# Patient Record
Sex: MALE | Race: WHITE | NOT HISPANIC OR LATINO | Employment: OTHER | URBAN - METROPOLITAN AREA
[De-identification: names, ages, dates, MRNs, and addresses within clinical notes are randomized per-mention and may not be internally consistent; named-entity substitution may affect disease eponyms.]

---

## 2020-07-08 ENCOUNTER — HOSPITAL ENCOUNTER (EMERGENCY)
Facility: CLINIC | Age: 50
Discharge: HOME | End: 2020-07-08
Attending: EMERGENCY MEDICINE | Admitting: EMERGENCY MEDICINE
Payer: COMMERCIAL

## 2020-07-08 ENCOUNTER — APPOINTMENT (OUTPATIENT)
Dept: CT IMAGING | Facility: CLINIC | Age: 50
End: 2020-07-08
Attending: EMERGENCY MEDICINE
Payer: COMMERCIAL

## 2020-07-08 VITALS
RESPIRATION RATE: 18 BRPM | DIASTOLIC BLOOD PRESSURE: 112 MMHG | OXYGEN SATURATION: 95 % | HEART RATE: 50 BPM | SYSTOLIC BLOOD PRESSURE: 184 MMHG | TEMPERATURE: 98.6 F

## 2020-07-08 DIAGNOSIS — I10 BENIGN ESSENTIAL HYPERTENSION: ICD-10-CM

## 2020-07-08 DIAGNOSIS — N20.1 LEFT URETERAL CALCULUS: ICD-10-CM

## 2020-07-08 LAB
ALBUMIN UR-MCNC: 30 MG/DL
ANION GAP SERPL CALCULATED.3IONS-SCNC: 8 MMOL/L (ref 3–14)
APPEARANCE UR: CLEAR
BASOPHILS # BLD AUTO: 0 10E9/L (ref 0–0.2)
BASOPHILS NFR BLD AUTO: 0.3 %
BILIRUB UR QL STRIP: NEGATIVE
BUN SERPL-MCNC: 19 MG/DL (ref 7–30)
CALCIUM SERPL-MCNC: 8.7 MG/DL (ref 8.5–10.1)
CAOX CRY #/AREA URNS HPF: ABNORMAL /HPF
CHLORIDE SERPL-SCNC: 111 MMOL/L (ref 94–109)
CO2 SERPL-SCNC: 21 MMOL/L (ref 20–32)
COLOR UR AUTO: YELLOW
CREAT SERPL-MCNC: 0.94 MG/DL (ref 0.66–1.25)
DIFFERENTIAL METHOD BLD: ABNORMAL
EOSINOPHIL # BLD AUTO: 0.2 10E9/L (ref 0–0.7)
EOSINOPHIL NFR BLD AUTO: 2.4 %
ERYTHROCYTE [DISTWIDTH] IN BLOOD BY AUTOMATED COUNT: 13.2 % (ref 10–15)
GFR SERPL CREATININE-BSD FRML MDRD: >90 ML/MIN/{1.73_M2}
GLUCOSE SERPL-MCNC: 116 MG/DL (ref 70–99)
GLUCOSE UR STRIP-MCNC: NEGATIVE MG/DL
HCT VFR BLD AUTO: 44.9 % (ref 40–53)
HGB BLD-MCNC: 15.6 G/DL (ref 13.3–17.7)
HGB UR QL STRIP: ABNORMAL
IMM GRANULOCYTES # BLD: 0 10E9/L (ref 0–0.4)
IMM GRANULOCYTES NFR BLD: 0.3 %
KETONES UR STRIP-MCNC: NEGATIVE MG/DL
LEUKOCYTE ESTERASE UR QL STRIP: NEGATIVE
LIPASE SERPL-CCNC: 117 U/L (ref 73–393)
LYMPHOCYTES # BLD AUTO: 1.9 10E9/L (ref 0.8–5.3)
LYMPHOCYTES NFR BLD AUTO: 29 %
MCH RBC QN AUTO: 33.9 PG (ref 26.5–33)
MCHC RBC AUTO-ENTMCNC: 34.7 G/DL (ref 31.5–36.5)
MCV RBC AUTO: 98 FL (ref 78–100)
MONOCYTES # BLD AUTO: 0.7 10E9/L (ref 0–1.3)
MONOCYTES NFR BLD AUTO: 10.6 %
MUCOUS THREADS #/AREA URNS LPF: PRESENT /LPF
NEUTROPHILS # BLD AUTO: 3.8 10E9/L (ref 1.6–8.3)
NEUTROPHILS NFR BLD AUTO: 57.4 %
NITRATE UR QL: NEGATIVE
NRBC # BLD AUTO: 0 10*3/UL
NRBC BLD AUTO-RTO: 0 /100
PH UR STRIP: 5.5 PH (ref 5–7)
PLATELET # BLD AUTO: 223 10E9/L (ref 150–450)
POTASSIUM SERPL-SCNC: 3.4 MMOL/L (ref 3.4–5.3)
RBC # BLD AUTO: 4.6 10E12/L (ref 4.4–5.9)
RBC #/AREA URNS AUTO: >182 /HPF (ref 0–2)
SODIUM SERPL-SCNC: 140 MMOL/L (ref 133–144)
SOURCE: ABNORMAL
SP GR UR STRIP: 1.03 (ref 1–1.03)
SQUAMOUS #/AREA URNS AUTO: <1 /HPF (ref 0–1)
UROBILINOGEN UR STRIP-MCNC: 2 MG/DL (ref 0–2)
WBC # BLD AUTO: 6.6 10E9/L (ref 4–11)
WBC #/AREA URNS AUTO: 0 /HPF (ref 0–5)

## 2020-07-08 PROCEDURE — 74176 CT ABD & PELVIS W/O CONTRAST: CPT

## 2020-07-08 PROCEDURE — 83690 ASSAY OF LIPASE: CPT | Performed by: EMERGENCY MEDICINE

## 2020-07-08 PROCEDURE — 25000128 H RX IP 250 OP 636: Performed by: EMERGENCY MEDICINE

## 2020-07-08 PROCEDURE — 85025 COMPLETE CBC W/AUTO DIFF WBC: CPT | Performed by: EMERGENCY MEDICINE

## 2020-07-08 PROCEDURE — 25000128 H RX IP 250 OP 636

## 2020-07-08 PROCEDURE — 80048 BASIC METABOLIC PNL TOTAL CA: CPT | Performed by: EMERGENCY MEDICINE

## 2020-07-08 PROCEDURE — 96375 TX/PRO/DX INJ NEW DRUG ADDON: CPT

## 2020-07-08 PROCEDURE — 96374 THER/PROPH/DIAG INJ IV PUSH: CPT

## 2020-07-08 PROCEDURE — 25000132 ZZH RX MED GY IP 250 OP 250 PS 637: Performed by: EMERGENCY MEDICINE

## 2020-07-08 PROCEDURE — 99285 EMERGENCY DEPT VISIT HI MDM: CPT | Mod: 25

## 2020-07-08 PROCEDURE — 96376 TX/PRO/DX INJ SAME DRUG ADON: CPT

## 2020-07-08 PROCEDURE — 81001 URINALYSIS AUTO W/SCOPE: CPT | Performed by: EMERGENCY MEDICINE

## 2020-07-08 RX ORDER — HYDROMORPHONE HYDROCHLORIDE 1 MG/ML
0.5 INJECTION, SOLUTION INTRAMUSCULAR; INTRAVENOUS; SUBCUTANEOUS
Status: DISCONTINUED | OUTPATIENT
Start: 2020-07-08 | End: 2020-07-08 | Stop reason: HOSPADM

## 2020-07-08 RX ORDER — HYDROCODONE BITARTRATE AND ACETAMINOPHEN 5; 325 MG/1; MG/1
1-2 TABLET ORAL EVERY 4 HOURS PRN
Qty: 13 TABLET | Refills: 0 | Status: SHIPPED | OUTPATIENT
Start: 2020-07-08 | End: 2021-01-04

## 2020-07-08 RX ORDER — ONDANSETRON 2 MG/ML
4 INJECTION INTRAMUSCULAR; INTRAVENOUS ONCE
Status: COMPLETED | OUTPATIENT
Start: 2020-07-08 | End: 2020-07-08

## 2020-07-08 RX ORDER — HYDROCODONE BITARTRATE AND ACETAMINOPHEN 5; 325 MG/1; MG/1
2 TABLET ORAL ONCE
Status: COMPLETED | OUTPATIENT
Start: 2020-07-08 | End: 2020-07-08

## 2020-07-08 RX ORDER — TAMSULOSIN HYDROCHLORIDE 0.4 MG/1
0.4 CAPSULE ORAL DAILY
Qty: 5 CAPSULE | Refills: 0 | Status: SHIPPED | OUTPATIENT
Start: 2020-07-08 | End: 2020-07-13

## 2020-07-08 RX ORDER — ONDANSETRON 4 MG/1
4 TABLET, ORALLY DISINTEGRATING ORAL EVERY 6 HOURS PRN
Qty: 15 TABLET | Refills: 0 | Status: SHIPPED | OUTPATIENT
Start: 2020-07-08 | End: 2021-01-04

## 2020-07-08 RX ORDER — KETOROLAC TROMETHAMINE 15 MG/ML
15 INJECTION, SOLUTION INTRAMUSCULAR; INTRAVENOUS ONCE
Status: COMPLETED | OUTPATIENT
Start: 2020-07-08 | End: 2020-07-08

## 2020-07-08 RX ORDER — ONDANSETRON 2 MG/ML
INJECTION INTRAMUSCULAR; INTRAVENOUS
Status: COMPLETED
Start: 2020-07-08 | End: 2020-07-08

## 2020-07-08 RX ORDER — LISINOPRIL 10 MG/1
10 TABLET ORAL DAILY
Qty: 30 TABLET | Refills: 0 | Status: SHIPPED | OUTPATIENT
Start: 2020-07-08 | End: 2021-01-04

## 2020-07-08 RX ADMIN — ONDANSETRON 4 MG: 2 INJECTION INTRAMUSCULAR; INTRAVENOUS at 05:20

## 2020-07-08 RX ADMIN — HYDROCODONE BITARTRATE AND ACETAMINOPHEN 2 TABLET: 5; 325 TABLET ORAL at 06:07

## 2020-07-08 RX ADMIN — KETOROLAC TROMETHAMINE 15 MG: 15 INJECTION, SOLUTION INTRAMUSCULAR; INTRAVENOUS at 04:33

## 2020-07-08 RX ADMIN — HYDROMORPHONE HYDROCHLORIDE 0.5 MG: 1 INJECTION, SOLUTION INTRAMUSCULAR; INTRAVENOUS; SUBCUTANEOUS at 05:40

## 2020-07-08 RX ADMIN — HYDROMORPHONE HYDROCHLORIDE 0.5 MG: 1 INJECTION, SOLUTION INTRAMUSCULAR; INTRAVENOUS; SUBCUTANEOUS at 04:36

## 2020-07-08 ASSESSMENT — ENCOUNTER SYMPTOMS
HEMATURIA: 0
COUGH: 0
NAUSEA: 0
CHILLS: 0
VOMITING: 0
SHORTNESS OF BREATH: 0
FEVER: 0
FLANK PAIN: 1
DIARRHEA: 1
FREQUENCY: 0

## 2020-07-08 NOTE — ED AVS SNAPSHOT
Emergency Department  [address] [facility] [address]  Phone:  [phone]  Fax:  [phone]                                    Nitin Samuels   MRN: [id number]    Department:   Emergency Department   Date of Visit:  7/8/2020           After Visit Summary Signature Page    I have received my discharge instructions, and my questions have been answered. I have discussed any challenges I see with this plan with the nurse or doctor.    ..........................................................................................................................................  Patient/Patient Representative Signature      ..........................................................................................................................................  Patient Representative Print Name and Relationship to Patient    ..................................................               ................................................  Date                                   Time    ..........................................................................................................................................  Reviewed by Signature/Title    ...................................................              ..............................................  Date                                               Time          22EPIC Rev 08/18

## 2020-07-08 NOTE — DISCHARGE INSTRUCTIONS
Take ibuprofen 600 mg every 6 hours as needed for pain (take with food)    Strain your urine    Drink plenty of fluids    Cut the Metoprolol in 1/2, so you will now be taking 12.5 mg daily    Opioid Medication Information    You have been given a prescription for an opioid (narcotic) pain medicine and/or have received a pain medicine while here in the Emergency Department. These medicines can make you drowsy or impaired. You must not drive, operate dangerous equipment, or engage in any other dangerous activities while taking these medications. If you drive while taking these medications, you could be arrested for DUI, or driving under the influence. Do not drink any alcohol while you are taking these medications.   Opioid pain medications can cause addiction. If you have a history of chemical dependency of any type, you are at a higher risk of becoming addicted to pain medications.  Only take these prescribed medications to treat your pain when all other options have been tried. Take it for as short a time and as few doses as possible. Store your pain pills in a secure place, as they are frequently stolen and provide a dangerous opportunity for children or visitors in your house to start abusing these powerful medications. We will not replace any lost or stolen medicine.  As soon as your pain is better, you should flush all your remaining medication.   Many prescription pain medications contain Tylenol  (acetaminophen), including Vicodin , Tylenol #3 , Norco , Lortab , and Percocet .  You should not take any extra pills of Tylenol  if you are using these prescription medications or you can get very sick.  Do not ever take more than 4000 mg of acetaminophen in any 24 hour period.  All opioids tend to cause constipation. Drink plenty of water and eat foods that have a lot of fiber, such as fruits, vegetables, prune juice, apple juice and high fiber cereal.  Take a laxative if you don t move your bowels at least every  other day. Miralax , Milk of Magnesia, Colace , or Senna  can be used to keep you regular.

## 2020-07-08 NOTE — ED PROVIDER NOTES
History     Chief Complaint:  Flank pain      HPI   Nitin Samuels is a 49 year old male who presents with flank pain. The patient developed left sided flank pain and diarrhea last week. He states that the flank pain became worse this morning. He has a history of kidney stones and required surgery to remove it in the past. He denies any hematuria, frequency, fevers, chills, nausea, vomiting, cough, shortness of breath, chest pain    Allergies:  Penicillins     Medications:    Metoprolol     Past Medical History:    Hypertension   Kidney stone     Past Surgical History:     surgery for stones  Hernia repair     Family History:    Family history reviewed. No pertinent family history.      Social History:  Smoking Status: Never Smoker  Smokeless Tobacco: Never Used  Alcohol Use: Yes  Drug Use: No  PCP: Clinic, [facility]     Review of Systems   Constitutional: Negative for chills and fever.   Respiratory: Negative for cough and shortness of breath.    Cardiovascular: Negative for chest pain.   Gastrointestinal: Positive for diarrhea. Negative for nausea and vomiting.   Genitourinary: Positive for flank pain (left). Negative for frequency and hematuria.   All other systems reviewed and are negative.      Physical Exam     Patient Vitals for the past 24 hrs:   BP Temp Temp src Pulse Heart Rate Resp SpO2   07/08/20 0445 (!) 171/94 -- -- (!) 42 -- -- 96 %   07/08/20 0440 (!) 173/102 -- -- (!) 39 -- -- 98 %   07/08/20 0401 (!) 164/95 98.6  F (37  C) Oral -- (!) 47 18 98 %        Physical Exam  Nursing note and vitals reviewed.  Constitutional:  Appears well-developed and well-nourished. Appears uncomfortable   HENT:   Head:    Atraumatic.   Mouth/Throat:   Oropharynx is clear and moist. No oropharyngeal exudate.   Eyes:    Pupils are equal, round, and reactive to light.   Neck:    Normal range of motion. Neck supple.      No tracheal deviation present. No thyromegaly present.   Cardiovascular:  Normal rate,  regular rhythm, no murmur   Pulmonary/Chest: Breath sounds are clear and equal without wheezes or crackles.  Abdominal:   Soft. Bowel sounds are normal. Exhibits no distension and      no mass. There is no tenderness.      There is no rebound and no guarding.   Back:   No CVA tenderness, no rash  Musculoskeletal:  Exhibits no edema.   Lymphadenopathy:  No cervical adenopathy.   Neurological:   Alert and oriented to person, place, and time.   Skin:    Skin is warm and dry. No rash noted. No pallor.      Emergency Department Course     Imaging:  Radiology findings were communicated with the patient who voiced understanding of the findings.    CT Abdomen Pelvis w/o Contrast  Final Result  IMPRESSION:   1.  Obstructing 2 and 4-5 mm left ureteric calculi.  Reading per radiology     Laboratory:  Laboratory findings were communicated with the patient who voiced understanding of the findings.    UA with micro and culture: Urine Blood moderate (A) Protein Albumin Urine 30 (A) RBC/HPF >182 (H) Mucous urine present (A) calcium oxalate few (A) o/w wnl/negative       CBC:  WBC 6.6, HGB 15.6, , o/w WNL     BMP: Glucose 116 (H), chloride 111 (H), o/w WNL (Creatinine: 0.94)     Lipase: 117      Interventions:  Medications   HYDROmorphone (PF) (DILAUDID) injection 0.5 mg (0.5 mg Intravenous Given 7/8/20 0540)   ketorolac (TORADOL) injection 15 mg (15 mg Intravenous Given 7/8/20 0433)   ondansetron (ZOFRAN) injection 4 mg (4 mg Intravenous Given 7/8/20 0520)   HYDROcodone-acetaminophen (NORCO) 5-325 MG per tablet 2 tablet (2 tablets Oral Given 7/8/20 0607)     Emergency Department Course:  Past medical records, nursing notes, and vitals reviewed.    0411 I performed an exam of the patient as documented above.    IV was inserted and blood was drawn for laboratory testing, results above.     The patient was sent for imaging while in the emergency department, results above.       0615 Patient rechecked and updated.       Findings  and plan explained to the Patient. Patient discharged home with instructions regarding supportive care, medications, and reasons to return. The importance of close follow-up was reviewed. The patient was prescribed Norco, lisinopril, zofran, and flomax.         Impression & Plan     Medical Decision Making:  Nitin Samuels is a 49 year old male who presents to the emergency department today with unilateral flank and abdominal pain consistent with renal colic. CT confirms a ureteral stone. Pain is controlled with interventions in the Emergency Department. There is no fever or evidence of a urinary tract infection. The patient will be discharged with opioid analgesics and Ibuprofen for pain. Flomax will be prescribed daily to attempt to ease stone passage.   Zofran was prescribed for nausea.  I considered other etiologies for these symptoms including AAA and pyelonephritis but these are unlikely given the otherwise normal CT scan and urinalysis.  The patient is instructed to return if increasing pain not controlled with pain meds, vomiting, and fever. Strain urine to look for stone, if detected, submit to primary doctor for lab analysis. Instructions were given to follow up with urology within one week, sooner if pain continues, as retrieval of the stone may be required for refractory symptoms.    Discharge Diagnosis:    ICD-10-CM    1. Left ureteral calculus  N20.1    2. Benign essential hypertension  I10        Disposition:  The patient is discharged to home.    Discharge Medications:  New Prescriptions    HYDROCODONE-ACETAMINOPHEN (NORCO) 5-325 MG TABLET    Take 1-2 tablets by mouth every 4 hours as needed for severe pain No driving a car or drinking alcohol for 6 hours after taking this medication.    LISINOPRIL (ZESTRIL) 10 MG TABLET    Take 1 tablet (10 mg) by mouth daily    ONDANSETRON (ZOFRAN ODT) 4 MG ODT TAB    Take 1 tablet (4 mg) by mouth every 6 hours as needed for nausea or vomiting    TAMSULOSIN  (FLOMAX) 0.4 MG CAPSULE    Take 1 capsule (0.4 mg) by mouth daily for 5 doses (take at bedtime)       Scribe Disclosure:  I, Zaki Murray, am serving as a scribe at 4:11 AM on 7/8/2020 to document services personally performed by Salome Bowser MD based on my observations and the provider's statements to me.      7/8/2020   Salome Bowser MD [place], Cheri Lee, MD  07/08/20 0840

## 2020-07-12 ENCOUNTER — NURSE TRIAGE (OUTPATIENT)
Dept: NURSING | Facility: CLINIC | Age: 50
End: 2020-07-12

## 2020-07-13 ENCOUNTER — VIRTUAL VISIT (OUTPATIENT)
Dept: URGENT CARE | Facility: CLINIC | Age: 50
End: 2020-07-13
Payer: COMMERCIAL

## 2020-07-13 ENCOUNTER — NURSE TRIAGE (OUTPATIENT)
Dept: NURSING | Facility: CLINIC | Age: 50
End: 2020-07-13

## 2020-07-13 DIAGNOSIS — N20.0 RENAL CALCULI: Primary | ICD-10-CM

## 2020-07-13 PROCEDURE — 99203 OFFICE O/P NEW LOW 30 MIN: CPT | Mod: 95

## 2020-07-13 NOTE — TELEPHONE ENCOUNTER
"Partner, Ana Maria, calls for patient. Asked to speak with patient who gave verbal permission to speak with her. Patient was seen in the ED on 7/8/20 for kidney stones. He was told he has a 5mm and 2mm stone. He did pass one stone but not sure which one. He has a urology appointment tomorrow.   He was in a lot of pain last night and having difficulty passing urine. It was recommended that he go to the ED. It was also recommended that he get pain under control by taking 2 tablets of Norco every 4 hrs, which he has done. However, he has only 3 tablets left. He is no longer having difficult urinating today and pain is managed as long as he takes 2 tablets every 4 hrs.   Patient just moved back here from [place] 2 weeks ago and does not have a PCP.     Since patient has already been to ED and pain is managed without difficulty urinating, recommended he schedule virtual visit with urgent care provider.     Transferred to scheduling.     BERT Olvera RN      Additional Information    Negative: Passed out (i.e., lost consciousness, collapsed and was not responding)    Negative: Shock suspected (e.g., cold/pale/clammy skin, too weak to stand, low BP, rapid pulse)    Negative: Sounds like a life-threatening emergency to the triager    Pain radiates into groin, scrotum    Answer Assessment - Initial Assessment Questions  1. LOCATION: \"Where does it hurt?\" (e.g., left, right)      Left lower abdomen and groin, radiates to back with walking  2. ONSET: \"When did the pain start?\"      Yesterday at 2 pm after already passing some other stones  3. SEVERITY: \"How bad is the pain?\" (e.g., Scale 1-10; mild, moderate, or severe)    - MILD (1-3): doesn't interfere with normal activities     - MODERATE (4-7): interferes with normal activities or awakens from sleep     - SEVERE (8-10): excruciating pain and patient unable to do normal activities (stays in bed)        +1-2/10 after taking pain meds  4. PATTERN: \"Does the pain come and go, or is " "it constant?\"       Constant   5. CAUSE: \"What do you think is causing the pain?\"      Kidney stone  6. OTHER SYMPTOMS:  \"Do you have any other symptoms?\" (e.g., fever, abdominal pain, vomiting, leg weakness, burning with urination, blood in urine)      Abdominal pain, last night difficulty with urination, but is able to urinate today  7. PREGNANCY:  \"Is there any chance you are pregnant?\" \"When was your last menstrual period?\"      n/a    Protocols used: FLANK PAIN-A-OH    COVID 19 Nurse Triage Plan/Patient Instructions    Please be aware that novel coronavirus (COVID-19) may be circulating in the community. If you develop symptoms such as fever, cough, or SOB or if you have concerns about the presence of another infection including coronavirus (COVID-19), please contact your health care provider or visit [email or web address].     Disposition/Instructions    Virtual Visit with provider recommended. Reference Visit Selection Guide.    Thank you for taking steps to prevent the spread of this virus.  o Limit your contact with others.  o Wear a simple mask to cover your cough.  o Wash your hands well and often.    Resources    [facility]: About COVID-19: [email or web address]    CDC: What to Do If You're Sick: www.cdc.gov/coronavirus/2019-ncov/about/steps-when-sick.html    CDC: Ending Home Isolation: www.cdc.gov/coronavirus/2019-ncov/hcp/disposition-in-home-patients.html     CDC: Caring for Someone: www.cdc.gov/coronavirus/2019-ncov/if-you-are-sick/care-for-someone.html     [facility]: Interim Guidance for Hospital Discharge to Home: www.health.[facility].mn.us/diseases/coronavirus/hcp/hospdischarge.pdf    [facility] clinical trials (COVID-19 research studies): clinicalaffairs.[facility]/umn-clinical-trials     Below are the COVID-19 hotlines at the [place] Department of Health ([facility]). Interpreters are available.   o For health questions: Call 326-930-3763 or 1-625.900.9640 (7 a.m. to 7 p.m.)  o For " questions about schools and childcare: Call 467-236-3322 or 1-268.162.7001 (7 a.m. to 7 p.m.)

## 2020-07-13 NOTE — TELEPHONE ENCOUNTER
"S: Kidney stone.  B:  Skyler gave writer permission to talk with Caryl his friend about his health. 7/8 left sided kidney stones were DX.  Patient has passed a few stones the size of a pin head.  Calling about  increase in  pain.  Rating pain a \"9\".  Has been having difficulty getting his stream to start.  When he does urinate it is just a small amount.  He feels there is still urine left in his bladder. Has been pushing fluids.  Feels he has taken in more fluids than has come out.   Writer ask Skyler to push on his abdomin just below belly button and it was painful.    A: Per guideline bring to the ED    R: Friend Caryl will drive Skyler to the [facility] ED.  Harika Wood RN, [facility]                                                                                                                                                                 Reason for Disposition    [1] Unable to urinate (or only a few drops) > 4 hours AND     [2] bladder feels very full (e.g., feels blocked with strong urge to urinate; palpable bladder)    Additional Information    Negative: Shock suspected (e.g., cold/pale/clammy skin, too weak to stand, low BP, rapid pulse)    Negative: Sounds like a life-threatening emergency to the triager    Protocols used: URINATION PAIN - MALE-A-AH    COVID 19 Nurse Triage Plan/Patient Instructions    Please be aware that novel coronavirus (COVID-19) may be circulating in the community. If you develop symptoms such as fever, cough, or SOB or if you have concerns about the presence of another infection including coronavirus (COVID-19), please contact your health care provider or visit [email or web address].     Disposition/Instructions    ED Visit recommended. Follow protocol based instructions.     Bring Your Own Device:  Please also bring your smart device(s) (smart phones, tablets, laptops) and their charging cables for your personal use and to communicate with your care team during your visit.    Thank " you for taking steps to prevent the spread of this virus.  o Limit your contact with others.  o Wear a simple mask to cover your cough.  o Wash your hands well and often.    Resources    [facility]: About COVID-19: [email or web address]    CDC: What to Do If You're Sick: www.cdc.gov/coronavirus/2019-ncov/about/steps-when-sick.html    CDC: Ending Home Isolation: www.cdc.gov/coronavirus/2019-ncov/hcp/disposition-in-home-patients.html     CDC: Caring for Someone: www.cdc.gov/coronavirus/2019-ncov/if-you-are-sick/care-for-someone.html     [facility]: Interim Guidance for Hospital Discharge to Home: www.[facility].mn.us/diseases/coronavirus/hcp/hospdischarge.pdf    [facility] clinical trials (COVID-19 research studies): clinicalaffairs.[facility]-clinical-trials     Below are the COVID-19 hotlines at the [place] Department of Health ([facility]). Interpreters are available.   o For health questions: Call 283-415-8377 or 1-591.211.5359 (7 a.m. to 7 p.m.)  o For questions about schools and childcare: Call 489-663-0522 or 1-757.531.4411 (7 a.m. to 7 p.m.)

## 2020-07-13 NOTE — PROGRESS NOTES
"Nitin Samuels is a 49 year old male who is being evaluated via a billable telephone visit.      The patient has been notified of following:     \"This telephone visit will be conducted via a call between you and your physician/provider. We have found that certain health care needs can be provided without the need for a physical exam.  This service lets us provide the care you need with a short phone conversation.  If a prescription is necessary we can send it directly to your pharmacy.  If lab work is needed we can place an order for that and you can then stop by our lab to have the test done at a later time.    Telephone visits are billed at different rates depending on your insurance coverage. During this emergency period, for some insurers they may be billed the same as an in-person visit.  Please reach out to your insurance provider with any questions.    If during the course of the call the physician/provider feels a telephone visit is not appropriate, you will not be charged for this service.\"    Patient has given verbal consent for Telephone visit?  Yes  What phone number would you like to be contacted at? [phone]        Subjective     Nitin Samuels is a 49 year old male who presents via phone visit today for the following health issues:    HPI  Treated in ER 7-8-2020 and found to have kidney stones. He was worse yesterday and instructed to return to the ER by triage nurse but did not go in. When I called him today he is doing better. It is 2:30 pm and he has not needed any pain medication since 7:30 this morning.He has 3 pain pills left and he was worried he was going to run out. He thinks he will be fine. I told him we do not refill pain meds via urgent care and he understands and does not need anymore at this time. He also understands that if he gets worse during the night he needs to return to the ER if pain is out of control. He sees urology tomorrow.         Review of Systems No fever, " sore throat, cough, chest pain, sob, GI symptoms. Only dealing with renal calculi symptoms which have settled down.         Objective   Reported vitals:  There were no vitals taken for this visit.     PSYCH: Alert and oriented times 3; coherent speech, normal   rate and volume, able to articulate logical thoughts, able   to abstract reason, no tangential thoughts, no hallucinations   or delusions    RESP: No cough, no audible wheezing, able to talk in full sentences  Remainder of exam unable to be completed due to telephone visits        Assessment/Plan:Sees urology tomorrow. Pain in under control today and he has 3 pain pills left.  He also has 1 Flomax left. He thinks he will be fine. If pain gets out of control will return to the ER.   Diagnosis Comments   1. Renal calculi               Phone call duration:  3.5 minutes    LISBETH Alvarez

## 2020-07-17 ENCOUNTER — OFFICE VISIT (OUTPATIENT)
Dept: UROLOGY | Facility: CLINIC | Age: 50
End: 2020-07-17
Payer: COMMERCIAL

## 2020-07-17 VITALS
BODY MASS INDEX: 30.06 KG/M2 | SYSTOLIC BLOOD PRESSURE: 130 MMHG | OXYGEN SATURATION: 97 % | WEIGHT: 210 LBS | HEART RATE: 62 BPM | HEIGHT: 70 IN | DIASTOLIC BLOOD PRESSURE: 78 MMHG

## 2020-07-17 DIAGNOSIS — N20.0 CALCULUS OF KIDNEY: ICD-10-CM

## 2020-07-17 DIAGNOSIS — N20.1 LEFT URETERAL STONE: Primary | ICD-10-CM

## 2020-07-17 LAB
ALBUMIN UR-MCNC: NEGATIVE MG/DL
APPEARANCE UR: CLEAR
BILIRUB UR QL STRIP: NEGATIVE
COLOR UR AUTO: YELLOW
GLUCOSE UR STRIP-MCNC: NEGATIVE MG/DL
HGB UR QL STRIP: ABNORMAL
KETONES UR STRIP-MCNC: NEGATIVE MG/DL
LEUKOCYTE ESTERASE UR QL STRIP: NEGATIVE
NITRATE UR QL: NEGATIVE
PH UR STRIP: 7 PH (ref 5–7)
SOURCE: ABNORMAL
SP GR UR STRIP: 1.02 (ref 1–1.03)
UROBILINOGEN UR STRIP-ACNC: 0.2 EU/DL (ref 0.2–1)

## 2020-07-17 PROCEDURE — 99204 OFFICE O/P NEW MOD 45 MIN: CPT | Performed by: UROLOGY

## 2020-07-17 PROCEDURE — 81003 URINALYSIS AUTO W/O SCOPE: CPT | Performed by: UROLOGY

## 2020-07-17 RX ORDER — KETOROLAC TROMETHAMINE 10 MG/1
10 TABLET, FILM COATED ORAL EVERY 6 HOURS
Qty: 20 TABLET | Refills: 0 | Status: SHIPPED | OUTPATIENT
Start: 2020-07-17 | End: 2020-07-22

## 2020-07-17 RX ORDER — TAMSULOSIN HYDROCHLORIDE 0.4 MG/1
0.4 CAPSULE ORAL DAILY
Qty: 30 CAPSULE | Refills: 0 | Status: SHIPPED | OUTPATIENT
Start: 2020-07-17 | End: 2020-08-13

## 2020-07-17 RX ORDER — OXYCODONE HYDROCHLORIDE 5 MG/1
5 TABLET ORAL EVERY 6 HOURS PRN
Qty: 9 TABLET | Refills: 0 | Status: SHIPPED | OUTPATIENT
Start: 2020-07-17 | End: 2021-01-04

## 2020-07-17 ASSESSMENT — PAIN SCALES - GENERAL: PAINLEVEL: NO PAIN (0)

## 2020-07-17 ASSESSMENT — MIFFLIN-ST. JEOR: SCORE: 1813.8

## 2020-07-17 NOTE — LETTER
7/17/2020       RE: Nitin Samuels  [address]  [facility] [address]     Dear Colleague,    Thank you for referring your patient, Nitin Samuels, to the [facility] at [facility]. Please see a copy of my visit note below.    [facility]  [facility]   Name: Nitin Samuels    MRN: [id number]   YOB: 1970       We were asked to see Nitin Samuels at the request of ER follow up for evaluation and treatment of LEFT ureteral stone.        Chief Complaint:   LEFT ureteral stone    History is obtained from the patient            History of Present Illness:   Nitin Samuels is a 49 year old male who is being seen for evaluation of LEFT ureteral stone.    Pain started last week  ER visit with 2 stones  Pain worsened over the weekend   Passed a small stone    Pain flared again yesterday  Using Vicodin for pain  No fevers or chills  Had some nausea, no vomiting since last week  No gross hematuria     Saw Jyotsna on Tuesday    Does have prior history of stones. 7 years ago which required Ureteroscopy at [facility]. Last episode on the left.     (4 or >)  Location: LEFT flank  Quality: intermittent  Severity: mild to severe  Duration: 1 week           Past Medical History:     Past Medical History:   Diagnosis Date     Hypertension             Past Surgical History:     Past Surgical History:   Procedure Laterality Date     CYSTOSCOPY       GENITOURINARY SURGERY      for stones     HERNIA REPAIR      inguinal left            Social History:     Social History     Tobacco Use     Smoking status: Never Smoker     Smokeless tobacco: Never Used   Substance Use Topics     Alcohol use: Yes     Comment: 2 glasses a day       History   Smoking Status     Never Smoker   Smokeless Tobacco     Never Used            Family History:     Family History   Problem Relation  "Age of Onset     Heart Disease Mother      Heart Disease Father               Allergies:     Allergies   Allergen Reactions     Penicillins             Medications:     Current Outpatient Medications   Medication Sig     Ascorbic Acid (VITAMIN C PO) Take  by mouth.     B Complex Vitamins (B COMPLEX 1) TABS Take  by mouth.     HYDROcodone-acetaminophen (NORCO) 5-325 MG tablet Take 1-2 tablets by mouth every 4 hours as needed for severe pain No driving a car or drinking alcohol for 6 hours after taking this medication.     lisinopril (ZESTRIL) 10 MG tablet Take 1 tablet (10 mg) by mouth daily (Patient not taking: Reported on 7/17/2020)     MAGNESIUM PO Take  by mouth.     Multiple Vitamin (MULTIVITAMINS PO) Take  by mouth.     Omega-3 Fatty Acids (FISH OIL) 500 MG CAPS Take 1,000 mg by mouth daily.     ondansetron (ZOFRAN ODT) 4 MG ODT tab Take 1 tablet (4 mg) by mouth every 6 hours as needed for nausea or vomiting (Patient not taking: Reported on 7/17/2020)     S-Adenosylmethionine (WILMAN-E) 400 MG TABS Take 1,200 mg by mouth.     Vitamins A & D (VITAMIN A & D) 87187-380 UNIT CAPS Take  by mouth.     Zinc 15 MG LOZG Take  by mouth.     No current facility-administered medications for this visit.              Review of Systems:     Skin: negative  Eyes: negative  Ears/Nose/Throat: negative  Respiratory: No shortness of breath, dyspnea on exertion, cough, or hemoptysis  Cardiovascular: negative  Gastrointestinal: negative  Genitourinary: as above  Musculoskeletal: negative  Neurologic: negative  Psychiatric: negative  Hematologic/Lymphatic/Immunologic: negative  Endocrine: negative          Physical Exam:     Patient Vitals for the past 24 hrs:   BP Pulse SpO2 Height Weight   07/17/20 0952 130/78 62 97 % 1.778 m (5' 10\") 95.3 kg (210 lb)     Body mass index is 30.13 kg/m .     General: age-appropriate appearing male in NAD  HEENT: Head AT/NC, EOMI, CN Grossly intact  Lungs: no respiratory distress, or pursed lip " breathing  Heart: No obvious jugular venous distension present  Back: no bony midline tenderness, no CVAT bilaterally.  Abdomen: soft, non-distended, non-tender. No organomegaly  : No costovertebral tenderness bilaterally   Lymph: no palpable inguinal lymphadenopathy.  LE: no edema.   Musculoskeltal: extremities normal, no peripheral edema  Skin: no suspicious lesions or rashes  Neuro: Alert, oriented, speech and mentation normal; moving all 4 extremities equally.  Psych: affect and mood normal          Data:   All laboratory data reviewed:    UA RESULTS:  Recent Labs   Lab Test 07/08/20  0429   COLOR Yellow   APPEARANCE Clear   URINEGLC Negative   URINEBILI Negative   URINEKETONE Negative   SG 1.027   UBLD Moderate*   URINEPH 5.5   PROTEIN 30*   NITRITE Negative   LEUKEST Negative   RBCU >182*   WBCU 0      Lab Results   Component Value Date    CR 0.94 07/08/2020      IMAGING:  All pertinent imaging reviewed:    All imaging studies reviewed by me.  I personally reviewed these imaging films.  A formal report from radiology will follow.    FINDINGS:   LOWER CHEST: Unremarkable     HEPATOBILIARY: Unremarkable     PANCREAS: Unremarkable     SPLEEN: Unremarkable     ADRENAL GLANDS: Unremarkable     KIDNEYS/BLADDER: Moderate left-sided hydronephrosis secondary to an obstructing mid left ureteric calculus measuring 4 to 5 mm in size. There is an additional 2 mm calculus in the distal left ureter several centimeters above the ureterovesical junction.   Bladder is decompressed.     BOWEL: No bowel obstruction.     LYMPH NODES: No significant retroperitoneal adenopathy     VASCULATURE: No abdominal aortic aneurysm     PELVIC ORGANS: No free fluid.     MUSCULOSKELETAL: Small fat-containing left inguinal and umbilical hernias.                                                    IMPRESSION:   1.  Obstructing 2 and 4-5 mm left ureteric calculi.             Impression and Plan:   Impression:   50-year-old man with left ureteral  stones      Plan:   Left ureteral stones  -We reviewed his labs and imaging.  He does have a distal 2 mm stone which is the stone that he likely passed as well as a 4 to 5 mm stone in the proximal ureter  -He is continuing to have ongoing pain requiring narcotic pain medicine  - We discussed treatment options which include:  ---- Medical Expulsive Therapy (MET): We discussed the based on the stone size and location he would be estimated to have a roughly 50% chance of spontaneous stone passage with 4 weeks of medical expulsive therapy  ---- URETEROSCOPY: we discussed that there would be 5-10% chance of requiring a staged procedure. We discussed the need for a ureteral stent.  -He would like to continue with medical expulsive therapy and hold a surgery date for ureteroscopy  -Orders for surgery placed  -Prescription sent for tamsulosin, ketorolac, oxycodone       Thank you for the kind consultation.    Chart documentation with Dragon Voice recognition Software. Although reviewed after completion, some words and grammatical errors may remain.     Time spent: 30 minutes of which >50% was spent counseling.    Mike Junior MD   Urology  [facility]  [facility] Phone: [phone]  [facility] Phone: [phone]

## 2020-07-17 NOTE — PROGRESS NOTES
Urology Consult History and Physical  [facility]   Name: Nitin Samuels    MRN: [id number]   YOB: 1970       We were asked to see Nitin Samuels at the request of ER follow up for evaluation and treatment of LEFT ureteral stone.        Chief Complaint:   LEFT ureteral stone    History is obtained from the patient            History of Present Illness:   Nitin Samuels is a 49 year old male who is being seen for evaluation of LEFT ureteral stone.    Pain started last week  ER visit with 2 stones  Pain worsened over the weekend   Passed a small stone    Pain flared again yesterday  Using Vicodin for pain  No fevers or chills  Had some nausea, no vomiting since last week  No gross hematuria     Saw Jyotsna on Tuesday    Does have prior history of stones. 7 years ago which required Ureteroscopy at [facility]. Last episode on the left.     (4 or >)  Location: LEFT flank  Quality: intermittent  Severity: mild to severe  Duration: 1 week           Past Medical History:     Past Medical History:   Diagnosis Date     Hypertension             Past Surgical History:     Past Surgical History:   Procedure Laterality Date     CYSTOSCOPY       GENITOURINARY SURGERY      for stones     HERNIA REPAIR      inguinal left            Social History:     Social History     Tobacco Use     Smoking status: Never Smoker     Smokeless tobacco: Never Used   Substance Use Topics     Alcohol use: Yes     Comment: 2 glasses a day       History   Smoking Status     Never Smoker   Smokeless Tobacco     Never Used            Family History:     Family History   Problem Relation Age of Onset     Heart Disease Mother      Heart Disease Father               Allergies:     Allergies   Allergen Reactions     Penicillins             Medications:     Current Outpatient Medications   Medication Sig     Ascorbic Acid (VITAMIN C PO) Take  by mouth.     B Complex Vitamins (B COMPLEX 1) TABS Take  by mouth.      "HYDROcodone-acetaminophen (NORCO) 5-325 MG tablet Take 1-2 tablets by mouth every 4 hours as needed for severe pain No driving a car or drinking alcohol for 6 hours after taking this medication.     lisinopril (ZESTRIL) 10 MG tablet Take 1 tablet (10 mg) by mouth daily (Patient not taking: Reported on 7/17/2020)     MAGNESIUM PO Take  by mouth.     Multiple Vitamin (MULTIVITAMINS PO) Take  by mouth.     Omega-3 Fatty Acids (FISH OIL) 500 MG CAPS Take 1,000 mg by mouth daily.     ondansetron (ZOFRAN ODT) 4 MG ODT tab Take 1 tablet (4 mg) by mouth every 6 hours as needed for nausea or vomiting (Patient not taking: Reported on 7/17/2020)     S-Adenosylmethionine (WILMAN-E) 400 MG TABS Take 1,200 mg by mouth.     Vitamins A & D (VITAMIN A & D) 35538-337 UNIT CAPS Take  by mouth.     Zinc 15 MG LOZG Take  by mouth.     No current facility-administered medications for this visit.              Review of Systems:     Skin: negative  Eyes: negative  Ears/Nose/Throat: negative  Respiratory: No shortness of breath, dyspnea on exertion, cough, or hemoptysis  Cardiovascular: negative  Gastrointestinal: negative  Genitourinary: as above  Musculoskeletal: negative  Neurologic: negative  Psychiatric: negative  Hematologic/Lymphatic/Immunologic: negative  Endocrine: negative          Physical Exam:     Patient Vitals for the past 24 hrs:   BP Pulse SpO2 Height Weight   07/17/20 0952 130/78 62 97 % 1.778 m (5' 10\") 95.3 kg (210 lb)     Body mass index is 30.13 kg/m .     General: age-appropriate appearing male in NAD  HEENT: Head AT/NC, EOMI, CN Grossly intact  Lungs: no respiratory distress, or pursed lip breathing  Heart: No obvious jugular venous distension present  Back: no bony midline tenderness, no CVAT bilaterally.  Abdomen: soft, non-distended, non-tender. No organomegaly  : No costovertebral tenderness bilaterally   Lymph: no palpable inguinal lymphadenopathy.  LE: no edema.   Musculoskeltal: extremities normal, no " peripheral edema  Skin: no suspicious lesions or rashes  Neuro: Alert, oriented, speech and mentation normal; moving all 4 extremities equally.  Psych: affect and mood normal          Data:   All laboratory data reviewed:    UA RESULTS:  Recent Labs   Lab Test 07/08/20  0429   COLOR Yellow   APPEARANCE Clear   URINEGLC Negative   URINEBILI Negative   URINEKETONE Negative   SG 1.027   UBLD Moderate*   URINEPH 5.5   PROTEIN 30*   NITRITE Negative   LEUKEST Negative   RBCU >182*   WBCU 0      Lab Results   Component Value Date    CR 0.94 07/08/2020      IMAGING:  All pertinent imaging reviewed:    All imaging studies reviewed by me.  I personally reviewed these imaging films.  A formal report from radiology will follow.    FINDINGS:   LOWER CHEST: Unremarkable     HEPATOBILIARY: Unremarkable     PANCREAS: Unremarkable     SPLEEN: Unremarkable     ADRENAL GLANDS: Unremarkable     KIDNEYS/BLADDER: Moderate left-sided hydronephrosis secondary to an obstructing mid left ureteric calculus measuring 4 to 5 mm in size. There is an additional 2 mm calculus in the distal left ureter several centimeters above the ureterovesical junction.   Bladder is decompressed.     BOWEL: No bowel obstruction.     LYMPH NODES: No significant retroperitoneal adenopathy     VASCULATURE: No abdominal aortic aneurysm     PELVIC ORGANS: No free fluid.     MUSCULOSKELETAL: Small fat-containing left inguinal and umbilical hernias.                                                    IMPRESSION:   1.  Obstructing 2 and 4-5 mm left ureteric calculi.             Impression and Plan:   Impression:   50-year-old man with left ureteral stones      Plan:   Left ureteral stones  -We reviewed his labs and imaging.  He does have a distal 2 mm stone which is the stone that he likely passed as well as a 4 to 5 mm stone in the proximal ureter  -He is continuing to have ongoing pain requiring narcotic pain medicine  - We discussed treatment options which  include:  ---- Medical Expulsive Therapy (MET): We discussed the based on the stone size and location he would be estimated to have a roughly 50% chance of spontaneous stone passage with 4 weeks of medical expulsive therapy  ---- URETEROSCOPY: we discussed that there would be 5-10% chance of requiring a staged procedure. We discussed the need for a ureteral stent.  -He would like to continue with medical expulsive therapy and hold a surgery date for ureteroscopy  -Orders for surgery placed  -Prescription sent for tamsulosin, ketorolac, oxycodone       Thank you for the kind consultation.    Chart documentation with Dragon Voice recognition Software. Although reviewed after completion, some words and grammatical errors may remain.     Time spent: 30 minutes of which >50% was spent counseling.    Mike Junior MD   Urology  [facility]  [facility] Phone: [phone]  [facility] Phone: [phone]

## 2020-07-23 ENCOUNTER — HOSPITAL ENCOUNTER (OUTPATIENT)
Facility: CLINIC | Age: 50
End: 2020-07-23
Attending: UROLOGY | Admitting: UROLOGY
Payer: COMMERCIAL

## 2020-07-23 DIAGNOSIS — Z11.59 ENCOUNTER FOR SCREENING FOR OTHER VIRAL DISEASES: Primary | ICD-10-CM

## 2020-07-23 DIAGNOSIS — N20.0 CALCULUS OF KIDNEY: ICD-10-CM

## 2020-07-23 DIAGNOSIS — N20.1 LEFT URETERAL STONE: ICD-10-CM

## 2020-08-13 DIAGNOSIS — N20.1 LEFT URETERAL STONE: ICD-10-CM

## 2020-08-13 RX ORDER — TAMSULOSIN HYDROCHLORIDE 0.4 MG/1
CAPSULE ORAL
Qty: 30 CAPSULE | Refills: 0 | Status: SHIPPED | OUTPATIENT
Start: 2020-08-13 | End: 2021-01-04

## 2021-01-04 ENCOUNTER — VIRTUAL VISIT (OUTPATIENT)
Dept: URGENT CARE | Facility: CLINIC | Age: 51
End: 2021-01-04
Payer: COMMERCIAL

## 2021-01-04 DIAGNOSIS — U07.1 CLINICAL DIAGNOSIS OF COVID-19: Primary | ICD-10-CM

## 2021-01-04 PROCEDURE — 99213 OFFICE O/P EST LOW 20 MIN: CPT | Mod: 95 | Performed by: FAMILY MEDICINE

## 2021-01-04 RX ORDER — BENZONATATE 100 MG/1
100-200 CAPSULE ORAL 3 TIMES DAILY PRN
Qty: 50 CAPSULE | Refills: 0 | Status: SHIPPED | OUTPATIENT
Start: 2021-01-04 | End: 2021-12-05

## 2021-01-05 NOTE — PROGRESS NOTES
"Nitin Samuels is a 50 year old male who is being evaluated via a billable video visit.      Video Start Time: 8:06 PM     Assessment & Plan     Clinical diagnosis of COVID-19  Tested outside [facility]. symptoms for near a week now.  - benzonatate (TESSALON) 100 MG capsule; Take 1-2 capsules (100-200 mg) by mouth 3 times daily as needed for cough  - COVID-19 [facility] Referral      15 minutes spent on the date of the encounter doing chart review, history and exam, documentation and further activities as noted above         BMI:   Estimated body mass index is 30.13 kg/m  as calculated from the following:    Height as of 7/17/20: 1.778 m (5' 10\").    Weight as of 7/17/20: 95.3 kg (210 lb).       With get well loop    No follow-ups on file.    Virtual Urgent Care  [facility]  -------------------------------------  Subjective     Nitin Samuels is a 50 year old male who presents to clinic today for the following health issues positive covid. symptoms started 6 days ago. No fever. Had diarrhea    Had some coughing jags that almost lead to vomiting Oximetry is 99.   HPI     Tried robitussin which helped some and mucinex.      no high risks conditions qualifying him for MAB therapy.      Objective           Vitals:  No vitals were obtained today due to virtual visit.  Does not appear shortness of breath         Video-Visit Details    Type of service:  Video Visit    Video End Time 8:15 PM     Originating Location (pt. Location): Home    Distant Location (provider location):  "[employer]     Platform used for Video Visit: Eduarda"

## 2021-01-10 ENCOUNTER — HEALTH MAINTENANCE LETTER (OUTPATIENT)
Age: 51
End: 2021-01-10

## 2021-01-11 ENCOUNTER — NURSE TRIAGE (OUTPATIENT)
Dept: CARE COORDINATION | Facility: CLINIC | Age: 51
End: 2021-01-11

## 2021-01-11 NOTE — TELEPHONE ENCOUNTER
Received notification of patient's report of worsening shortness of breath via [employer]. Called patient. He does not have shortness of breath but reports lingering dry, hacking cough. He has been ill with diagnosed COVID-19 viral illness for 2 weeks. Will send patient information via [employer] for home treatment measures for management of cough, worrisome signs/symptoms that require urgent medical evaluation and 24/7 [facility] phone number should patient have questions or concerns after hours. Patient verbalizes agreement with plan.

## 2021-01-13 ENCOUNTER — NURSE TRIAGE (OUTPATIENT)
Dept: CARE COORDINATION | Facility: CLINIC | Age: 51
End: 2021-01-13

## 2021-01-13 NOTE — TELEPHONE ENCOUNTER
"    Answer Assessment - Initial Assessment Questions  1. RESPIRATORY STATUS: \"Describe your breathing?\" (e.g., wheezing, shortness of breath, unable to speak, severe coughing)       Shortness of breath with laying flat; lying flat triggers cough  2. ONSET: \"When did this breathing problem begin?\"       yesterday  3. PATTERN \"Does the difficult breathing come and go, or has it been constant since it started?\"       Comes and goes; worse at night and in the morning  4. SEVERITY: \"How bad is your breathing?\" (e.g., mild, moderate, severe)     - MILD: No SOB at rest, mild SOB with walking, speaks normally in sentences, can lay down, no retractions, pulse < 100.     - MODERATE: SOB at rest, SOB with minimal exertion and prefers to sit, cannot lie down flat, speaks in phrases, mild retractions, audible wheezing, pulse 100-120.     - SEVERE: Very SOB at rest, speaks in single words, struggling to breathe, sitting hunched forward, retractions, pulse > 120       mild  5. RECURRENT SYMPTOM: \"Have you had difficulty breathing before?\" If so, ask: \"When was the last time?\" and \"What happened that time?\"       Pneumonia and bronchitis  6. CARDIAC HISTORY: \"Do you have any history of heart disease?\" (e.g., heart attack, angina, bypass surgery, angioplasty)       no  7. LUNG HISTORY: \"Do you have any history of lung disease?\"  (e.g., pulmonary embolus, asthma, emphysema)      no  8. CAUSE: \"What do you think is causing the breathing problem?\"       COVID-19  9. OTHER SYMPTOMS: \"Do you have any other symptoms? (e.g., dizziness, runny nose, cough, chest pain, fever)      Dry cough  10. PREGNANCY: \"Is there any chance you are pregnant?\" \"When was your last menstrual period?\"        no  11. TRAVEL: \"Have you traveled out of the country in the last month?\" (e.g., travel history, exposures)        no    Protocols used: BREATHING DIFFICULTY-A-[place]      "

## 2021-01-13 NOTE — TELEPHONE ENCOUNTER
Received notification of patient's report of worsening shortness of breath via [employer]. Called patient. He notes mild feeling of shortness of breath when lying flat. He states this is manageable but thought he should report it. Continues with dry cough. He has had pneumonia in the past and understands symptoms. Will send patient information via [employer] for home treatment measures for management of cough, worrisome signs/symptoms that require urgent medical evaluation and [facility] phone number should patient have questions or concerns after hours. Patient verbalizes agreement with plan.

## 2021-01-19 ENCOUNTER — NURSE TRIAGE (OUTPATIENT)
Dept: CARE COORDINATION | Facility: CLINIC | Age: 51
End: 2021-01-19

## 2021-01-19 NOTE — TELEPHONE ENCOUNTER
Received notification of patient's report of worsening shortness of breath via [employer]. He continues with mostly dry cough, but is occasionally productive. He is not having minal shortness of breath but feels like it may be a little harder to breath when lying flat. He feels things are overall manageable at this time. Previously sent patient information via [employer] for home treatment measures for management of cough, worrisome signs/symptoms that require urgent medical evaluation and 24/7 [facility] phone number should patient have questions or concerns after hours. Patient verbalizes agreement with plan.

## 2021-10-23 ENCOUNTER — HEALTH MAINTENANCE LETTER (OUTPATIENT)
Age: 51
End: 2021-10-23

## 2021-11-05 ENCOUNTER — TRANSFERRED RECORDS (OUTPATIENT)
Dept: HEALTH INFORMATION MANAGEMENT | Facility: CLINIC | Age: 51
End: 2021-11-05
Payer: COMMERCIAL

## 2021-11-08 DIAGNOSIS — R07.9 CHEST PAIN: Primary | ICD-10-CM

## 2021-11-08 DIAGNOSIS — R07.89 ATYPICAL CHEST PAIN: Primary | ICD-10-CM

## 2021-11-27 ENCOUNTER — HOSPITAL ENCOUNTER (EMERGENCY)
Facility: CLINIC | Age: 51
Discharge: LEFT BEFORE DISCHARGE | End: 2021-11-27
Admitting: EMERGENCY MEDICINE
Payer: COMMERCIAL

## 2021-11-27 VITALS
DIASTOLIC BLOOD PRESSURE: 104 MMHG | BODY MASS INDEX: 31.14 KG/M2 | RESPIRATION RATE: 18 BRPM | WEIGHT: 217 LBS | SYSTOLIC BLOOD PRESSURE: 151 MMHG | TEMPERATURE: 97.4 F | OXYGEN SATURATION: 99 % | HEART RATE: 88 BPM

## 2021-11-27 LAB
ALBUMIN SERPL-MCNC: 3.6 G/DL (ref 3.4–5)
ALP SERPL-CCNC: 68 U/L (ref 40–150)
ALT SERPL W P-5'-P-CCNC: 38 U/L (ref 0–70)
ANION GAP SERPL CALCULATED.3IONS-SCNC: 5 MMOL/L (ref 3–14)
AST SERPL W P-5'-P-CCNC: 26 U/L (ref 0–45)
ATRIAL RATE - MUSE: 81 BPM
BASOPHILS # BLD AUTO: 0 10E3/UL (ref 0–0.2)
BASOPHILS NFR BLD AUTO: 0 %
BILIRUB SERPL-MCNC: 0.6 MG/DL (ref 0.2–1.3)
BUN SERPL-MCNC: 21 MG/DL (ref 7–30)
CALCIUM SERPL-MCNC: 8.9 MG/DL (ref 8.5–10.1)
CHLORIDE BLD-SCNC: 111 MMOL/L (ref 94–109)
CO2 SERPL-SCNC: 23 MMOL/L (ref 20–32)
CREAT SERPL-MCNC: 1.17 MG/DL (ref 0.66–1.25)
DIASTOLIC BLOOD PRESSURE - MUSE: NORMAL MMHG
EOSINOPHIL # BLD AUTO: 0.1 10E3/UL (ref 0–0.7)
EOSINOPHIL NFR BLD AUTO: 2 %
ERYTHROCYTE [DISTWIDTH] IN BLOOD BY AUTOMATED COUNT: 12.9 % (ref 10–15)
GFR SERPL CREATININE-BSD FRML MDRD: 71 ML/MIN/1.73M2
GLUCOSE BLD-MCNC: 114 MG/DL (ref 70–99)
HCT VFR BLD AUTO: 46.6 % (ref 40–53)
HGB BLD-MCNC: 16.3 G/DL (ref 13.3–17.7)
HOLD SPECIMEN: NORMAL
IMM GRANULOCYTES # BLD: 0 10E3/UL
IMM GRANULOCYTES NFR BLD: 0 %
INTERPRETATION ECG - MUSE: NORMAL
LYMPHOCYTES # BLD AUTO: 1.6 10E3/UL (ref 0.8–5.3)
LYMPHOCYTES NFR BLD AUTO: 21 %
MCH RBC QN AUTO: 33.6 PG (ref 26.5–33)
MCHC RBC AUTO-ENTMCNC: 35 G/DL (ref 31.5–36.5)
MCV RBC AUTO: 96 FL (ref 78–100)
MONOCYTES # BLD AUTO: 0.6 10E3/UL (ref 0–1.3)
MONOCYTES NFR BLD AUTO: 9 %
NEUTROPHILS # BLD AUTO: 5 10E3/UL (ref 1.6–8.3)
NEUTROPHILS NFR BLD AUTO: 68 %
NRBC # BLD AUTO: 0 10E3/UL
NRBC BLD AUTO-RTO: 0 /100
P AXIS - MUSE: 57 DEGREES
PLATELET # BLD AUTO: 217 10E3/UL (ref 150–450)
POTASSIUM BLD-SCNC: 3.6 MMOL/L (ref 3.4–5.3)
PR INTERVAL - MUSE: 200 MS
PROT SERPL-MCNC: 7.4 G/DL (ref 6.8–8.8)
QRS DURATION - MUSE: 100 MS
QT - MUSE: 388 MS
QTC - MUSE: 450 MS
R AXIS - MUSE: 129 DEGREES
RBC # BLD AUTO: 4.85 10E6/UL (ref 4.4–5.9)
SODIUM SERPL-SCNC: 139 MMOL/L (ref 133–144)
SYSTOLIC BLOOD PRESSURE - MUSE: NORMAL MMHG
T AXIS - MUSE: 25 DEGREES
TROPONIN I SERPL HS-MCNC: 7 NG/L
TROPONIN I SERPL-MCNC: <0.015 UG/L (ref 0–0.04)
VENTRICULAR RATE- MUSE: 81 BPM
WBC # BLD AUTO: 7.3 10E3/UL (ref 4–11)

## 2021-11-27 PROCEDURE — 82040 ASSAY OF SERUM ALBUMIN: CPT | Performed by: EMERGENCY MEDICINE

## 2021-11-27 PROCEDURE — 84484 ASSAY OF TROPONIN QUANT: CPT | Performed by: EMERGENCY MEDICINE

## 2021-11-27 PROCEDURE — 82247 BILIRUBIN TOTAL: CPT | Performed by: EMERGENCY MEDICINE

## 2021-11-27 PROCEDURE — 85025 COMPLETE CBC W/AUTO DIFF WBC: CPT | Performed by: EMERGENCY MEDICINE

## 2021-11-27 PROCEDURE — 999N000104 HC STATISTIC NO CHARGE

## 2021-11-27 PROCEDURE — 36415 COLL VENOUS BLD VENIPUNCTURE: CPT | Performed by: EMERGENCY MEDICINE

## 2021-11-27 PROCEDURE — 93005 ELECTROCARDIOGRAM TRACING: CPT

## 2021-12-05 ENCOUNTER — HOSPITAL ENCOUNTER (EMERGENCY)
Facility: CLINIC | Age: 51
Discharge: HOME | End: 2021-12-05
Attending: EMERGENCY MEDICINE | Admitting: EMERGENCY MEDICINE
Payer: COMMERCIAL

## 2021-12-05 ENCOUNTER — APPOINTMENT (OUTPATIENT)
Dept: GENERAL RADIOLOGY | Facility: CLINIC | Age: 51
End: 2021-12-05
Attending: EMERGENCY MEDICINE

## 2021-12-05 VITALS
BODY MASS INDEX: 31.5 KG/M2 | DIASTOLIC BLOOD PRESSURE: 100 MMHG | TEMPERATURE: 98 F | HEIGHT: 70 IN | RESPIRATION RATE: 10 BRPM | HEART RATE: 55 BPM | OXYGEN SATURATION: 96 % | WEIGHT: 220 LBS | SYSTOLIC BLOOD PRESSURE: 135 MMHG

## 2021-12-05 DIAGNOSIS — R00.2 PALPITATIONS: ICD-10-CM

## 2021-12-05 DIAGNOSIS — R07.9 CHEST PAIN, UNSPECIFIED TYPE: ICD-10-CM

## 2021-12-05 LAB
ALBUMIN SERPL-MCNC: 3.6 G/DL (ref 3.4–5)
ALP SERPL-CCNC: 68 U/L (ref 40–150)
ALT SERPL W P-5'-P-CCNC: 45 U/L (ref 0–70)
ANION GAP SERPL CALCULATED.3IONS-SCNC: 7 MMOL/L (ref 3–14)
AST SERPL W P-5'-P-CCNC: 26 U/L (ref 0–45)
ATRIAL RATE - MUSE: 66 BPM
BASOPHILS # BLD AUTO: 0 10E3/UL (ref 0–0.2)
BASOPHILS NFR BLD AUTO: 0 %
BILIRUB DIRECT SERPL-MCNC: 0.2 MG/DL (ref 0–0.2)
BILIRUB SERPL-MCNC: 0.8 MG/DL (ref 0.2–1.3)
BUN SERPL-MCNC: 15 MG/DL (ref 7–30)
CALCIUM SERPL-MCNC: 8.3 MG/DL (ref 8.5–10.1)
CHLORIDE BLD-SCNC: 108 MMOL/L (ref 94–109)
CO2 SERPL-SCNC: 25 MMOL/L (ref 20–32)
CREAT SERPL-MCNC: 1.19 MG/DL (ref 0.66–1.25)
DIASTOLIC BLOOD PRESSURE - MUSE: NORMAL MMHG
EOSINOPHIL # BLD AUTO: 0.1 10E3/UL (ref 0–0.7)
EOSINOPHIL NFR BLD AUTO: 2 %
ERYTHROCYTE [DISTWIDTH] IN BLOOD BY AUTOMATED COUNT: 12.6 % (ref 10–15)
GFR SERPL CREATININE-BSD FRML MDRD: 69 ML/MIN/1.73M2
GLUCOSE BLD-MCNC: 104 MG/DL (ref 70–99)
HCT VFR BLD AUTO: 47.1 % (ref 40–53)
HGB BLD-MCNC: 16.3 G/DL (ref 13.3–17.7)
HOLD SPECIMEN: NORMAL
IMM GRANULOCYTES # BLD: 0 10E3/UL
IMM GRANULOCYTES NFR BLD: 0 %
INTERPRETATION ECG - MUSE: NORMAL
LIPASE SERPL-CCNC: 90 U/L (ref 73–393)
LYMPHOCYTES # BLD AUTO: 1 10E3/UL (ref 0.8–5.3)
LYMPHOCYTES NFR BLD AUTO: 16 %
MCH RBC QN AUTO: 33.7 PG (ref 26.5–33)
MCHC RBC AUTO-ENTMCNC: 34.6 G/DL (ref 31.5–36.5)
MCV RBC AUTO: 97 FL (ref 78–100)
MONOCYTES # BLD AUTO: 0.5 10E3/UL (ref 0–1.3)
MONOCYTES NFR BLD AUTO: 8 %
NEUTROPHILS # BLD AUTO: 4.9 10E3/UL (ref 1.6–8.3)
NEUTROPHILS NFR BLD AUTO: 74 %
NRBC # BLD AUTO: 0 10E3/UL
NRBC BLD AUTO-RTO: 0 /100
NT-PROBNP SERPL-MCNC: 26 PG/ML (ref 0–900)
P AXIS - MUSE: 26 DEGREES
PLATELET # BLD AUTO: 196 10E3/UL (ref 150–450)
POTASSIUM BLD-SCNC: 3.9 MMOL/L (ref 3.4–5.3)
PR INTERVAL - MUSE: 210 MS
PROT SERPL-MCNC: 7.3 G/DL (ref 6.8–8.8)
QRS DURATION - MUSE: 108 MS
QT - MUSE: 400 MS
QTC - MUSE: 419 MS
R AXIS - MUSE: 29 DEGREES
RBC # BLD AUTO: 4.84 10E6/UL (ref 4.4–5.9)
SODIUM SERPL-SCNC: 140 MMOL/L (ref 133–144)
SYSTOLIC BLOOD PRESSURE - MUSE: NORMAL MMHG
T AXIS - MUSE: 24 DEGREES
TROPONIN I SERPL HS-MCNC: 8 NG/L
TSH SERPL DL<=0.005 MIU/L-ACNC: 2.67 MU/L (ref 0.4–4)
VENTRICULAR RATE- MUSE: 66 BPM
WBC # BLD AUTO: 6.6 10E3/UL (ref 4–11)

## 2021-12-05 PROCEDURE — 85025 COMPLETE CBC W/AUTO DIFF WBC: CPT | Performed by: EMERGENCY MEDICINE

## 2021-12-05 PROCEDURE — 83690 ASSAY OF LIPASE: CPT | Performed by: EMERGENCY MEDICINE

## 2021-12-05 PROCEDURE — 71046 X-RAY EXAM CHEST 2 VIEWS: CPT

## 2021-12-05 PROCEDURE — 93005 ELECTROCARDIOGRAM TRACING: CPT

## 2021-12-05 PROCEDURE — 80048 BASIC METABOLIC PNL TOTAL CA: CPT | Performed by: EMERGENCY MEDICINE

## 2021-12-05 PROCEDURE — 84484 ASSAY OF TROPONIN QUANT: CPT | Performed by: EMERGENCY MEDICINE

## 2021-12-05 PROCEDURE — 82248 BILIRUBIN DIRECT: CPT | Performed by: EMERGENCY MEDICINE

## 2021-12-05 PROCEDURE — 36415 COLL VENOUS BLD VENIPUNCTURE: CPT | Performed by: EMERGENCY MEDICINE

## 2021-12-05 PROCEDURE — 83880 ASSAY OF NATRIURETIC PEPTIDE: CPT | Performed by: EMERGENCY MEDICINE

## 2021-12-05 PROCEDURE — 84443 ASSAY THYROID STIM HORMONE: CPT | Performed by: EMERGENCY MEDICINE

## 2021-12-05 PROCEDURE — 99285 EMERGENCY DEPT VISIT HI MDM: CPT | Mod: 25

## 2021-12-05 ASSESSMENT — MIFFLIN-ST. JEOR: SCORE: 1849.16

## 2021-12-05 NOTE — ED PROVIDER NOTES
"History   Chief Complaint:  \"it feels like my heart stops\"    HPI   History supplemented by electronic chart review    Nitin Samuels is a 51 year old male who presents with his partner for evaluation of abnormal sensations in his chest.  For several years, he is occasionally woken up feeling that his heart is going slow and has to \"restart\", and this has been happening about a half dozen times per night recently.  She also occasionally has chest discomfort that last up to half an hour, but is not related to activity, position, or other clear factors.  No vomiting, cough, fevers.  No recent injury.  No leg swelling.  Several years ago he had an event recorder performed in 2019 for the same symptoms, showing primarily sinus rhythm with occasional PVCs.  Last night he experienced brief electric shock pain from his left foot all the way up to his heart that promptly resolved without intervention.  He has never been diagnosed with coronary artery disease and is not a smoker.  He was evaluated by his primary clinician several weeks ago for the symptoms and a stress test was ordered that is due to take place 2 days from now.    Review of Systems  All other systems reviewed and negative except as above in HPI.    Allergies:  Pcn [Penicillins]     Medications:    No current outpatient medications on file.      Past Medical History:    Past Medical History:   Diagnosis Date     Hypertension        Patient Active Problem List    Diagnosis Date Noted     Calculus of kidney 07/23/2020     Priority: Medium     Added automatically from request for surgery [id number]       Left ureteral stone 07/23/2020     Priority: Medium     Added automatically from request for surgery [id number]       Rotator cuff (capsule) sprain 03/29/2012     Priority: Medium     Chondromalacia of patella 03/29/2012     Priority: Medium        Past Surgical History:    Past Surgical History:   Procedure Laterality Date     CYSTOSCOPY       GENITOURINARY " "SURGERY      for stones     HERNIA REPAIR      inguinal left        Family History:    family history includes Heart Disease in his father and mother.    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    Physical Exam     Patient Vitals for the past 24 hrs:   BP Temp Temp src Pulse Resp SpO2 Height Weight   12/05/21 1207 (!) 135/100 -- -- 55 10 96 % -- --   12/05/21 1053 -- -- -- (!) 46 12 -- -- --   12/05/21 1045 -- -- -- 54 22 -- -- --   12/05/21 1030 -- -- -- 56 17 -- -- --   12/05/21 1015 -- -- -- 54 17 -- -- --   12/05/21 1000 -- -- -- 53 14 -- -- --   12/05/21 0945 -- -- -- 62 13 -- -- --   12/05/21 0920 (!) 142/94 -- -- 59 13 -- -- --   12/05/21 0824 (!) 163/108 98  F (36.7  C) Temporal 72 16 98 % 1.778 m (5' 10\") 99.8 kg (220 lb)      Physical Exam  General: Nontoxic-appearing male sitting upright in room 15, female partner at bedside  HENT: wearing mask, thyroid nonpalpable  CV: rate as above, regular rhythm, no lower extremity edema, no JVD, palpable symmetric radial DP and PT pulses, no murmur audible  Resp: normal effort, speaks in full phrases, no stridor, no cough observed  GI: abdomen soft and nontender  MSK: no bony tenderness, good range of motion of arms, no chest crepitus  Skin: appropriately warm and dry, no chest or abdominal rash  Neuro: alert, clear speech, oriented, normal strength and sensation throughout all 4 extremities, responds briskly appropriate all questions and commands  Psych: cooperative    Emergency Department Course   Electrocardiogram  ECG taken at 0823, ECG interpreted at 0922 by MOHIT Viera MD  Sinus rhythm with old first-degree AV block, possible septal infarct which is pre-existing  Rate 66 bpm. MD interval 210. QRS duration 108. QTc 419      Imaging:    XR Chest 2 Views   Final Result   IMPRESSION: Negative chest. Lungs clear. No pleural effusions. No pneumothorax.             Laboratory:  Labs Ordered and Resulted from Time of ED Arrival to Time of ED Departure "   BASIC METABOLIC PANEL - Abnormal       Result Value    Sodium 140      Potassium 3.9      Chloride 108      Carbon Dioxide (CO2) 25      Anion Gap 7      Urea Nitrogen 15      Creatinine 1.19      Calcium 8.3 (*)     Glucose 104 (*)     GFR Estimate 69     CBC WITH PLATELETS AND DIFFERENTIAL - Abnormal    WBC Count 6.6      RBC Count 4.84      Hemoglobin 16.3      Hematocrit 47.1      MCV 97      MCH 33.7 (*)     MCHC 34.6      RDW 12.6      Platelet Count 196      % Neutrophils 74      % Lymphocytes 16      % Monocytes 8      % Eosinophils 2      % Basophils 0      % Immature Granulocytes 0      NRBCs per 100 WBC 0      Absolute Neutrophils 4.9      Absolute Lymphocytes 1.0      Absolute Monocytes 0.5      Absolute Eosinophils 0.1      Absolute Basophils 0.0      Absolute Immature Granulocytes 0.0      Absolute NRBCs 0.0     TROPONIN I - Normal    Troponin I High Sensitivity 8     LIPASE - Normal    Lipase 90     NT PROBNP INPATIENT - Normal    N terminal Pro BNP Inpatient 26     HEPATIC FUNCTION PANEL - Normal    Bilirubin Total 0.8      Bilirubin Direct 0.2      Protein Total 7.3      Albumin 3.6      Alkaline Phosphatase 68      AST 26      ALT 45     TSH WITH FREE T4 REFLEX - Normal    TSH 2.67        Emergency Department Course:  Reviewed:  I reviewed nursing notes, vitals, and past medical history    Assessments:  I obtained history and examined the patient as noted above.     ED Course as of 12/05/21 1745   Sun Dec 05, 2021   1135 I updated pt on results so far and lab delay   1135 Rechecked patient, discussed final results     Interventions:  Medications - No data to display     Disposition:  Discharged home    Impression & Plan    Covid-19  Oskar Dalye was evaluated during a global COVID-19 pandemic, which necessitated consideration that the patient might be at risk for infection with the SARS-CoV-2 virus that causes COVID-19.   Applicable protocols for evaluation were followed during the patient's  care.   COVID-19 was considered as part of the patient's evaluation.     Medical Decision Making:  Differential diagnosis included acute coronary syndrome, pulmonary embolism, arrhythmia, referred pain from upper abdominal conditions and many others. No signs of acutely serious cardiovascular process at this time. Some reassurance is provided by the fact that he has had the symptoms to some degree for quite some time. Blood pressure initially elevated though improved without specific intervention. He feels well at this time. The reassurances as well as limitations of today's testing was discussed with the patient and his wife, both of whom are comfortable with the plan for discharge home and close outpatient follow-up, including keeping the appointment already arranged through his clinic for a stress test in several days. Return here for crisis at any hour.    Diagnosis:    ICD-10-CM    1. Chest pain, unspecified type  R07.9    2. Palpitations  R00.2       12/5/2021   MOHIT Viera MD    This note was completed in part using Dragon voice recognition software. Although reviewed after completion, some word and grammatical errors may occur.           Edmond Viera MD  12/05/21 6992

## 2021-12-05 NOTE — ED TRIAGE NOTES
Pt states that for the last 2 years he has been woke up with a heart flutter; states that in the last 2 weeks it has been happening more often.  Last night pt states a pain, which felt like an electrical shock went from his leg up to his heart.

## 2021-12-07 ENCOUNTER — HOSPITAL ENCOUNTER (EMERGENCY)
Facility: CLINIC | Age: 51
Discharge: HOME | End: 2021-12-07
Attending: EMERGENCY MEDICINE | Admitting: EMERGENCY MEDICINE
Payer: COMMERCIAL

## 2021-12-07 ENCOUNTER — HOSPITAL ENCOUNTER (OUTPATIENT)
Dept: CARDIOLOGY | Facility: CLINIC | Age: 51
Discharge: HOME | End: 2021-12-07
Attending: FAMILY MEDICINE | Admitting: FAMILY MEDICINE

## 2021-12-07 VITALS
DIASTOLIC BLOOD PRESSURE: 111 MMHG | RESPIRATION RATE: 14 BRPM | TEMPERATURE: 98.2 F | SYSTOLIC BLOOD PRESSURE: 165 MMHG | HEART RATE: 62 BPM | OXYGEN SATURATION: 98 %

## 2021-12-07 DIAGNOSIS — R07.9 CHEST PAIN, UNSPECIFIED TYPE: ICD-10-CM

## 2021-12-07 DIAGNOSIS — K20.0 EOSINOPHILIC ESOPHAGITIS: ICD-10-CM

## 2021-12-07 DIAGNOSIS — R03.0 ELEVATED BLOOD PRESSURE READING: ICD-10-CM

## 2021-12-07 DIAGNOSIS — G47.00 INSOMNIA, UNSPECIFIED TYPE: ICD-10-CM

## 2021-12-07 DIAGNOSIS — R07.89 ATYPICAL CHEST PAIN: ICD-10-CM

## 2021-12-07 LAB
ANION GAP SERPL CALCULATED.3IONS-SCNC: 6 MMOL/L (ref 3–14)
ATRIAL RATE - MUSE: 60 BPM
BASOPHILS # BLD AUTO: 0 10E3/UL (ref 0–0.2)
BASOPHILS NFR BLD AUTO: 1 %
BUN SERPL-MCNC: 17 MG/DL (ref 7–30)
CALCIUM SERPL-MCNC: 8.6 MG/DL (ref 8.5–10.1)
CHLORIDE BLD-SCNC: 110 MMOL/L (ref 94–109)
CO2 SERPL-SCNC: 21 MMOL/L (ref 20–32)
CREAT SERPL-MCNC: 0.95 MG/DL (ref 0.66–1.25)
DIASTOLIC BLOOD PRESSURE - MUSE: NORMAL MMHG
EOSINOPHIL # BLD AUTO: 0.2 10E3/UL (ref 0–0.7)
EOSINOPHIL NFR BLD AUTO: 2 %
ERYTHROCYTE [DISTWIDTH] IN BLOOD BY AUTOMATED COUNT: 12.4 % (ref 10–15)
GFR SERPL CREATININE-BSD FRML MDRD: >90 ML/MIN/1.73M2
GLUCOSE BLD-MCNC: 113 MG/DL (ref 70–99)
HCT VFR BLD AUTO: 46.8 % (ref 40–53)
HGB BLD-MCNC: 16.1 G/DL (ref 13.3–17.7)
HOLD SPECIMEN: NORMAL
IMM GRANULOCYTES # BLD: 0 10E3/UL
IMM GRANULOCYTES NFR BLD: 0 %
INTERPRETATION ECG - MUSE: NORMAL
LYMPHOCYTES # BLD AUTO: 1.6 10E3/UL (ref 0.8–5.3)
LYMPHOCYTES NFR BLD AUTO: 25 %
MCH RBC QN AUTO: 32.9 PG (ref 26.5–33)
MCHC RBC AUTO-ENTMCNC: 34.4 G/DL (ref 31.5–36.5)
MCV RBC AUTO: 96 FL (ref 78–100)
MONOCYTES # BLD AUTO: 0.6 10E3/UL (ref 0–1.3)
MONOCYTES NFR BLD AUTO: 9 %
NEUTROPHILS # BLD AUTO: 4.2 10E3/UL (ref 1.6–8.3)
NEUTROPHILS NFR BLD AUTO: 63 %
NRBC # BLD AUTO: 0 10E3/UL
NRBC BLD AUTO-RTO: 0 /100
P AXIS - MUSE: 30 DEGREES
PLATELET # BLD AUTO: 209 10E3/UL (ref 150–450)
POTASSIUM BLD-SCNC: 3.7 MMOL/L (ref 3.4–5.3)
PR INTERVAL - MUSE: 208 MS
QRS DURATION - MUSE: 108 MS
QT - MUSE: 436 MS
QTC - MUSE: 436 MS
R AXIS - MUSE: 100 DEGREES
RBC # BLD AUTO: 4.89 10E6/UL (ref 4.4–5.9)
SODIUM SERPL-SCNC: 137 MMOL/L (ref 133–144)
SYSTOLIC BLOOD PRESSURE - MUSE: NORMAL MMHG
T AXIS - MUSE: 13 DEGREES
TROPONIN I SERPL HS-MCNC: 9 NG/L
VENTRICULAR RATE- MUSE: 60 BPM
WBC # BLD AUTO: 6.6 10E3/UL (ref 4–11)

## 2021-12-07 PROCEDURE — 250N000013 HC RX MED GY IP 250 OP 250 PS 637: Performed by: EMERGENCY MEDICINE

## 2021-12-07 PROCEDURE — 93321 DOPPLER ECHO F-UP/LMTD STD: CPT | Mod: TC

## 2021-12-07 PROCEDURE — 93321 DOPPLER ECHO F-UP/LMTD STD: CPT | Mod: 26 | Performed by: INTERNAL MEDICINE

## 2021-12-07 PROCEDURE — 36415 COLL VENOUS BLD VENIPUNCTURE: CPT | Performed by: EMERGENCY MEDICINE

## 2021-12-07 PROCEDURE — 93018 CV STRESS TEST I&R ONLY: CPT | Performed by: INTERNAL MEDICINE

## 2021-12-07 PROCEDURE — 85025 COMPLETE CBC W/AUTO DIFF WBC: CPT | Performed by: EMERGENCY MEDICINE

## 2021-12-07 PROCEDURE — 93350 STRESS TTE ONLY: CPT | Mod: 26 | Performed by: INTERNAL MEDICINE

## 2021-12-07 PROCEDURE — 93325 DOPPLER ECHO COLOR FLOW MAPG: CPT | Mod: 26 | Performed by: INTERNAL MEDICINE

## 2021-12-07 PROCEDURE — 93005 ELECTROCARDIOGRAM TRACING: CPT

## 2021-12-07 PROCEDURE — 250N000009 HC RX 250: Performed by: EMERGENCY MEDICINE

## 2021-12-07 PROCEDURE — 93016 CV STRESS TEST SUPVJ ONLY: CPT | Performed by: INTERNAL MEDICINE

## 2021-12-07 PROCEDURE — 99284 EMERGENCY DEPT VISIT MOD MDM: CPT

## 2021-12-07 PROCEDURE — 93350 STRESS TTE ONLY: CPT | Mod: TC

## 2021-12-07 PROCEDURE — 80048 BASIC METABOLIC PNL TOTAL CA: CPT | Performed by: EMERGENCY MEDICINE

## 2021-12-07 PROCEDURE — 84484 ASSAY OF TROPONIN QUANT: CPT | Performed by: EMERGENCY MEDICINE

## 2021-12-07 RX ORDER — HYDROXYZINE HYDROCHLORIDE 25 MG/1
50-100 TABLET, FILM COATED ORAL
Qty: 60 TABLET | Refills: 0 | Status: ON HOLD | OUTPATIENT
Start: 2021-12-07 | End: 2024-05-22

## 2021-12-07 RX ADMIN — LIDOCAINE HYDROCHLORIDE 30 ML: 20 SOLUTION ORAL; TOPICAL at 04:51

## 2021-12-07 NOTE — DISCHARGE INSTRUCTIONS
Try over the counter 4% lidocaine patches for pain by left rib cage.  [employer] is one brand that makes them.    Discharge Instructions  Chest Pain    You have been seen today for chest pain or discomfort.  At this time, your provider has found no signs that your chest pain is due to a serious or life-threatening condition, (or you have declined more testing and/or admission to the hospital). However, sometimes there is a serious problem that does not show up right away. Your evaluation today may not be complete and you may need further testing and evaluation.     Generally, every Emergency Department visit should have a follow-up clinic visit with either a primary or a specialty clinic/provider. Please follow-up as instructed by your emergency provider today.  Return to the Emergency Department if:  Your chest pain changes, gets worse, starts to happen more often, or comes with less activity.  You are newly short of breath.  You get very weak or tired.  You pass out or faint.  You have any new symptoms, like fever, cough, numb legs, or you cough up blood.  You have anything else that worries you.    Until you follow-up with your regular provider, please do the following:  Take one aspirin daily unless you have an allergy or are told not to by your provider.  If a stress test appointment has been made, go to the appointment.  If you have questions, contact your regular provider.  Follow-up with your regular provider/clinic as directed; this is very important.    If you were given a prescription for medicine here today, be sure to read all of the information (including the package insert) that comes with your prescription.  This will include important information about the medicine, its side effects, and any warnings that you need to know about.  The pharmacist who fills the prescription can provide more information and answer questions you may have about the medicine.  If you have questions or concerns that the  pharmacist cannot address, please call or return to the Emergency Department.       Remember that you can always come back to the Emergency Department if you are not able to see your regular provider in the amount of time listed above, if you get any new symptoms, or if there is anything that worries you.

## 2021-12-07 NOTE — ED PROVIDER NOTES
History     Chief Complaint:  Chest Pain       HPI   Nitin Samuels is a 51 year old male who presents with left sided chest pain that has been waking him up every night for the past week.  Radiates into left arm.  Sometimes associated arm numbness.  Symptoms are never present during the day.  No associated shortness of breath, nausea, fever, worsening back pain.  In the past has happened intermittently over 2-3 years but this is more frequent.  Has had increased stress in the past month but states this has resolved.  Does not seem related to symptoms.  Scheduled for stress test today for symptoms.  Also h/o eosinophilic esophagitis that he is not currently on medications for.    ROS:  Review of Systems   Ten system ROS reviewed and is negative except as above     Allergies:  Apple  Pcn [Penicillins]     Medications:    hydrOXYzine (ATARAX) 25 MG tablet  omeprazole (PRILOSEC) 20 MG DR capsule        Past Medical History:    Past Medical History:   Diagnosis Date     Hypertension      Patient Active Problem List   Diagnosis     Rotator cuff (capsule) sprain     Chondromalacia of patella     Calculus of kidney     Left ureteral stone        Past Surgical History:    Past Surgical History:   Procedure Laterality Date     CYSTOSCOPY       GENITOURINARY SURGERY      for stones     HERNIA REPAIR      inguinal left        Family History:    family history includes Heart Disease in his father and mother.    Social History:   reports that he has never smoked. He has never used smokeless tobacco. He reports current alcohol use. He reports that he does not use drugs.  PCP: Curtis, [facility]     Physical Exam     Patient Vitals for the past 24 hrs:   BP Temp Temp src Pulse Resp SpO2   12/07/21 0339 (!) 165/111 98.2  F (36.8  C) Temporal 62 14 98 %        Physical Exam  Eyes:  Sclera white; Pupils are equal and round  ENT:    External ears and nares normal, no goiter  CV:  Rate as above with regular rhythm      Focal tenderness tense muscles in one area inferior L rib cage    No BLE edema  Resp:  Breath sounds clear and equal bilaterally  GI:  Abdomen is soft, non-tender  MS:  Moves all extremities  Skin:  Warm and dry  Neuro:  Speech is normal and fluent. Alert.        Emergency Department Course   ECG:  Time: 0338  Vent. Rate 60 bpm. FL interval 208. QRS duration 108. QT/QTc 436/436.  Read time: 0345 STEMI check (negative)  Interpretation: NSR, rightward axis, possible anterior infarct age undetermined, abnormal ECG  Interpreted by Dr. Patel at 0435, agree, no significant change vs 12/5/21      Laboratory:  Labs Ordered and Resulted from Time of ED Arrival to Time of ED Departure   BASIC METABOLIC PANEL - Abnormal       Result Value    Sodium 137      Potassium 3.7      Chloride 110 (*)     Carbon Dioxide (CO2) 21      Anion Gap 6      Urea Nitrogen 17      Creatinine 0.95      Calcium 8.6      Glucose 113 (*)     GFR Estimate >90     TROPONIN I - Normal    Troponin I High Sensitivity 9     CBC WITH PLATELETS AND DIFFERENTIAL    WBC Count 6.6      RBC Count 4.89      Hemoglobin 16.1      Hematocrit 46.8      MCV 96      MCH 32.9      MCHC 34.4      RDW 12.4      Platelet Count 209      % Neutrophils 63      % Lymphocytes 25      % Monocytes 9      % Eosinophils 2      % Basophils 1      % Immature Granulocytes 0      NRBCs per 100 WBC 0      Absolute Neutrophils 4.2      Absolute Lymphocytes 1.6      Absolute Monocytes 0.6      Absolute Eosinophils 0.2      Absolute Basophils 0.0      Absolute Immature Granulocytes 0.0      Absolute NRBCs 0.0          Interventions:  Medications   lidocaine (viscous) (XYLOCAINE) 2 % 15 mL, alum & mag hydroxide-simethicone (MAALOX) 15 mL GI Cocktail (30 mLs Oral Given 12/7/21 7053)      Medical Decision Making:  EKG w/o ischemia, dysrhythmia, or pericarditis.  Serial HS troponins normal.  Not c/w ACS.  CXR performed on 12/5/21 and was not repeated today.  Blood work also without signs  of infection, contributing electrolyte abnormalities, anemia, or renal insufficiency.  Eosinophilic esophagitis is a possible etiology of symptoms.  Will treat with omeprazole.  Has a focal tense muscle along edge of ribcage.  Will try lidoderm OTC.  Had tachycardia requiring hospital evaluation after trazodone in the past.  Will try hydroxyzine for sleep.  Do not take prior to stress test today.  Follow-up with PCP to discuss symptoms and results.    Diagnosis:    ICD-10-CM    1. Chest pain, unspecified type  R07.9    2. Insomnia, unspecified type  G47.00    3. Eosinophilic esophagitis  K20.0    4. Elevated blood pressure reading  R03.0         Discharge Medications:  New Prescriptions    HYDROXYZINE (ATARAX) 25 MG TABLET    Take 2-4 tablets ( mg) by mouth nightly as needed (sleep)    OMEPRAZOLE (PRILOSEC) 20 MG DR CAPSULE    Take 1 capsule (20 mg) by mouth daily        12/7/2021   Ericka Patel, *        Ericka Patel MD  12/07/21 0501

## 2021-12-07 NOTE — ED TRIAGE NOTES
Patient reports he has been seen a few times for this atypical chest pain. He notes that it,  Only happens when he is sleeping. Patient states his biggest issue is that he is unable to sleep. Patient has stress test scheduled for this morning.

## 2021-12-14 ENCOUNTER — OFFICE VISIT (OUTPATIENT)
Dept: CARDIOLOGY | Facility: CLINIC | Age: 51
End: 2021-12-14
Attending: FAMILY MEDICINE
Payer: COMMERCIAL

## 2021-12-14 VITALS
OXYGEN SATURATION: 95 % | WEIGHT: 220.2 LBS | BODY MASS INDEX: 31.52 KG/M2 | HEIGHT: 70 IN | SYSTOLIC BLOOD PRESSURE: 156 MMHG | DIASTOLIC BLOOD PRESSURE: 107 MMHG | HEART RATE: 60 BPM

## 2021-12-14 DIAGNOSIS — I49.3 PVC'S (PREMATURE VENTRICULAR CONTRACTIONS): Primary | ICD-10-CM

## 2021-12-14 DIAGNOSIS — Z13.220 LIPID SCREENING: ICD-10-CM

## 2021-12-14 PROCEDURE — 99204 OFFICE O/P NEW MOD 45 MIN: CPT | Performed by: INTERNAL MEDICINE

## 2021-12-14 RX ORDER — METOPROLOL SUCCINATE 25 MG/1
25 TABLET, EXTENDED RELEASE ORAL DAILY
Qty: 90 TABLET | Refills: 3 | Status: ON HOLD | OUTPATIENT
Start: 2021-12-14 | End: 2024-05-22

## 2021-12-14 ASSESSMENT — MIFFLIN-ST. JEOR: SCORE: 1850.07

## 2021-12-14 NOTE — LETTER
12/14/2021    [facility]  [address]  [facility] [address]    RE: Nitin Robertsondestin       Dear Colleague,     I had the pleasure of seeing Nitin Samuels in the [facility].  CARDIOLOGY CLINIC CONSULT    REASON FOR CONSULT: palpitations, arm pain, chest pain    PRIMARY CARE PHYSICIAN:  [facility]    HISTORY OF PRESENT ILLNESS:  Nitin Samuels is a very nice 51 year old gentleman with untreated dyslipidemia elevated blood pressure and symptomatic PVCs on a ziopatch in 2016 at [facility] for evaluation of left arm discomfort with some chest discomfort and palpitations.  About 2 weeks ago was been having episodes of arm numbness and waking up with a hard heart beat, like his heart was jumping, like one big jump.      During the period he was having symptoms he was going through some severe stressors. There has been some relief of this stress and symptoms have improved quite a bit.  He has been trying to exercise regularly and also focusing on a heart-healthy plant-based diet.     He has felt much better in the past 4 days. Symptoms have largely but not completely resolved.   He had a stress echo which was negative for ischemia (see below), and these results were reviewed. I also reviewed the paper chart records from his PCP office.      BP is elevated in clinic and he states that is about what it has been running at home.       PAST MEDICAL HISTORY:  Past Medical History:   Diagnosis Date     Hypertension    Eosinophilic esophagitis  Kidney stones  Dyslipidemia  in 2019. He is not on a statin.   Inguinal hernia  Adenomatous colon polyp    MEDICATIONS:  Current Outpatient Medications   Medication     hydrOXYzine (ATARAX) 25 MG tablet     omeprazole (PRILOSEC) 20 MG DR capsule     No current facility-administered medications for this visit.       ALLERGIES:  Allergies   Allergen Reactions     Apple Other (See Comments)      Mouth itches, lips swell up     Pcn [Penicillins]        SOCIAL HISTORY:  I have reviewed this patient's social history and updated it with pertinent information if needed. Nitin Samuels  reports that he has never smoked. He has never used smokeless tobacco. He reports current alcohol use. He reports that he does not use drugs.  Drinks up to 2 drinks per day.    FAMILY HISTORY:  I have reviewed this patient's family history and updated it with pertinent information if needed.   Family History   Problem Relation Age of Onset     Heart Disease Mother      Heart Disease Father    Multiple family members with CAD/MI history    REVIEW OF SYSTEMS:  Skin:  Positive for     Eyes:  Positive for glasses  ENT:  Negative    Respiratory:  Negative shortness of breath;dyspnea on exertion;sleep apnea  Cardiovascular:  chest pain;Negative for;dizziness;lightheadedness;fatigue;edema palpitations;Positive for;lightheadedness  Gastroenterology: Positive for heartburn  Genitourinary:  Negative    Musculoskeletal:  Positive for back pain  Neurologic:  Negative migraine headaches;headaches  Psychiatric:  Positive for sleep disturbances;anxiety  Heme/Lymph/Imm:  not assessed    Endocrine:  Negative for thyroid disorder;diabetes    PHYSICAL EXAM:      BP: (!) 156/107 Pulse: 60     SpO2: 95 %      Vital Signs with Ranges  Pulse:  [60] 60  BP: (156-158)/() 156/107  SpO2:  [95 %] 95 %  220 lbs 3.2 oz    Constitutional: awake, alert, no distress  Eyes: sclera nonicteric  ENT: trachea midline  Respiratory: clear to auscultation bilaterally  Cardiovascular: regular rate and rhythm no murmur  GI: nondistended, nontender, bowel sounds present  Skin: dry, no rash no edema  Musculoskeletal: grossly normal muscle bulk and tone  Neuropsychiatric: normal affect      DATA:   Labs from PCP and care everywhere also reviewed  LAST BMP:  Lab Results   Component Value Date     12/07/2021     07/08/2020      Lab Results    Component Value Date    POTASSIUM 3.7 12/07/2021    POTASSIUM 3.4 07/08/2020     Lab Results   Component Value Date    CHLORIDE 110 12/07/2021    CHLORIDE 111 07/08/2020     Lab Results   Component Value Date    DEMETRIUS 8.6 12/07/2021    DEMETRIUS 8.7 07/08/2020     Lab Results   Component Value Date    CO2 21 12/07/2021    CO2 21 07/08/2020     Lab Results   Component Value Date    BUN 17 12/07/2021    BUN 19 07/08/2020     Lab Results   Component Value Date    CR 0.95 12/07/2021    CR 0.94 07/08/2020     Lab Results   Component Value Date     12/07/2021     07/08/2020       LAST CBC:  Lab Results   Component Value Date    WBC 6.6 12/07/2021    WBC 6.6 07/08/2020     Lab Results   Component Value Date    RBC 4.89 12/07/2021    RBC 4.60 07/08/2020     Lab Results   Component Value Date    HGB 16.1 12/07/2021    HGB 15.6 07/08/2020     Lab Results   Component Value Date    HCT 46.8 12/07/2021    HCT 44.9 07/08/2020     Lab Results   Component Value Date    MCV 96 12/07/2021    MCV 98 07/08/2020     Lab Results   Component Value Date    MCH 32.9 12/07/2021    MCH 33.9 07/08/2020     Lab Results   Component Value Date    MCHC 34.4 12/07/2021    MCHC 34.7 07/08/2020     Lab Results   Component Value Date    RDW 12.4 12/07/2021    RDW 13.2 07/08/2020     Lab Results   Component Value Date     12/07/2021     07/08/2020         EKG 12/7/21 normal sinus rhythm rightward axis      Stress echocardiogram 12/7/2021  Interpretation Summary  1. Normal stress echocardiogram study at a diagnostic level of peak stress  (86% MPHR).  2. No symptoms with exercise  3. Normal blood pressure response.  4. EKG at rest and with stress with no ischemic ST changes. Occasional PVCS.  5. Exercise capacity is above average for age.  6. Baseline screening echocardiogram demonstrates no significant valve  dysfunction. Ascending aorta measures upper limit of normal (3.7 cm).    Holter monitor on [facility] [facility] [place]   2016  CONCLUSION:   1. Underlying rhythm is normal sinus rhythm with heart rate range  with   an average heart rate of 64 bpm.   2. Occasional ventricular ectopy with overall burden of <1%.   3. Rare supraventricular ectopy with overall burden of <1%.   4. No pauses were noted.   5. No sustained arrhythmias were observed.   6. Diary entries for symptoms of palpitations, dizziness correlate with   observed PVCs.       Coronarary angiogram: No results found.      ASSESSMENT:  1. Chest pain, non-exertional with symptoms improving.  Stress test reassuring/negative for ischemia. Suspect this is a part of symptomatic PVCs and not unstable angina.   2. Hypertension, new diagnosis  3. Dyslipidemia    Because symptoms have mostly resolved we will focus on prevention with treatment of BP and repeat lipids.  He already carries a diagnosis of symptomatic PVCs and has a normal EF on his recent stress echo. Offered a statin today given known dyslipidemia, but he would prefer to see his current lab results and try to manage with diet and exercise and follow up.      Will start a BB for treatment of HTN and hopefully to suppress symptomatic PVCs.      RECOMMENDATIONS:  1.  Start metoprolol succinate 25mg by mouth daily.  2.  Have cholesterol labs drawn today and repeat in 3 months.  3.  Follow up in 3 months. Consider starting asa and statin.  4.  Therapeutic lifestyle changes were discussed.     Ina Marin MD [facility]  Text Page       Thank you for allowing me to participate in the care of your patient.      Sincerely,     Ina Mrain MD     [facility]  cc:   Lillie Camargo PA-C  [facility]  [address] [facility] [address] [facility] [address] [place] [id number]

## 2021-12-14 NOTE — PROGRESS NOTES
CARDIOLOGY CLINIC CONSULT    REASON FOR CONSULT: palpitations, arm pain, chest pain    PRIMARY CARE PHYSICIAN:  [facility]    HISTORY OF PRESENT ILLNESS:  Nitin Samuels is a very nice 51 year old gentleman with untreated dyslipidemia elevated blood pressure and symptomatic PVCs on a ziopatch in 2016 at [facility] here for evaluation of left arm discomfort with some chest discomfort and palpitations.  About 2 weeks ago was been having episodes of arm numbness and waking up with a hard heart beat, like his heart was jumping, like one big jump.      During the period he was having symptoms he was going through some severe stressors. There has been some relief of this stress and symptoms have improved quite a bit.  He has been trying to exercise regularly and also focusing on a heart-healthy plant-based diet.     He has felt much better in the past 4 days. Symptoms have largely but not completely resolved.   He had a stress echo which was negative for ischemia (see below), and these results were reviewed. I also reviewed the paper chart records from his PCP office.      BP is elevated in clinic and he states that is about what it has been running at home.       PAST MEDICAL HISTORY:  Past Medical History:   Diagnosis Date     Hypertension    Eosinophilic esophagitis  Kidney stones  Dyslipidemia  in 2019. He is not on a statin.   Inguinal hernia  Adenomatous colon polyp    MEDICATIONS:  Current Outpatient Medications   Medication     hydrOXYzine (ATARAX) 25 MG tablet     omeprazole (PRILOSEC) 20 MG DR capsule     No current facility-administered medications for this visit.       ALLERGIES:  Allergies   Allergen Reactions     Apple Other (See Comments)     Mouth itches, lips swell up     Pcn [Penicillins]        SOCIAL HISTORY:  I have reviewed this patient's social history and updated it with pertinent information if needed. Nitin Samuels  reports that he has never smoked. He  has never used smokeless tobacco. He reports current alcohol use. He reports that he does not use drugs.  Drinks up to 2 drinks per day.    FAMILY HISTORY:  I have reviewed this patient's family history and updated it with pertinent information if needed.   Family History   Problem Relation Age of Onset     Heart Disease Mother      Heart Disease Father    Multiple family members with CAD/MI history    REVIEW OF SYSTEMS:  Skin:  Positive for     Eyes:  Positive for glasses  ENT:  Negative    Respiratory:  Negative shortness of breath;dyspnea on exertion;sleep apnea  Cardiovascular:  chest pain;Negative for;dizziness;lightheadedness;fatigue;edema palpitations;Positive for;lightheadedness  Gastroenterology: Positive for heartburn  Genitourinary:  Negative    Musculoskeletal:  Positive for back pain  Neurologic:  Negative migraine headaches;headaches  Psychiatric:  Positive for sleep disturbances;anxiety  Heme/Lymph/Imm:  not assessed    Endocrine:  Negative for thyroid disorder;diabetes    PHYSICAL EXAM:      BP: (!) 156/107 Pulse: 60     SpO2: 95 %      Vital Signs with Ranges  Pulse:  [60] 60  BP: (156-158)/() 156/107  SpO2:  [95 %] 95 %  220 lbs 3.2 oz    Constitutional: awake, alert, no distress  Eyes: sclera nonicteric  ENT: trachea midline  Respiratory: clear to auscultation bilaterally  Cardiovascular: regular rate and rhythm no murmur  GI: nondistended, nontender, bowel sounds present  Skin: dry, no rash no edema  Musculoskeletal: grossly normal muscle bulk and tone  Neuropsychiatric: normal affect      DATA:   Labs from PCP and care everywhere also reviewed  LAST BMP:  Lab Results   Component Value Date     12/07/2021     07/08/2020      Lab Results   Component Value Date    POTASSIUM 3.7 12/07/2021    POTASSIUM 3.4 07/08/2020     Lab Results   Component Value Date    CHLORIDE 110 12/07/2021    CHLORIDE 111 07/08/2020     Lab Results   Component Value Date    DEMETRIUS 8.6 12/07/2021    DEMETRIUS 8.7  07/08/2020     Lab Results   Component Value Date    CO2 21 12/07/2021    CO2 21 07/08/2020     Lab Results   Component Value Date    BUN 17 12/07/2021    BUN 19 07/08/2020     Lab Results   Component Value Date    CR 0.95 12/07/2021    CR 0.94 07/08/2020     Lab Results   Component Value Date     12/07/2021     07/08/2020       LAST CBC:  Lab Results   Component Value Date    WBC 6.6 12/07/2021    WBC 6.6 07/08/2020     Lab Results   Component Value Date    RBC 4.89 12/07/2021    RBC 4.60 07/08/2020     Lab Results   Component Value Date    HGB 16.1 12/07/2021    HGB 15.6 07/08/2020     Lab Results   Component Value Date    HCT 46.8 12/07/2021    HCT 44.9 07/08/2020     Lab Results   Component Value Date    MCV 96 12/07/2021    MCV 98 07/08/2020     Lab Results   Component Value Date    MCH 32.9 12/07/2021    MCH 33.9 07/08/2020     Lab Results   Component Value Date    MCHC 34.4 12/07/2021    MCHC 34.7 07/08/2020     Lab Results   Component Value Date    RDW 12.4 12/07/2021    RDW 13.2 07/08/2020     Lab Results   Component Value Date     12/07/2021     07/08/2020         EKG 12/7/21 normal sinus rhythm rightward axis      Stress echocardiogram 12/7/2021  Interpretation Summary  1. Normal stress echocardiogram study at a diagnostic level of peak stress  (86% MPHR).  2. No symptoms with exercise  3. Normal blood pressure response.  4. EKG at rest and with stress with no ischemic ST changes. Occasional PVCS.  5. Exercise capacity is above average for age.  6. Baseline screening echocardiogram demonstrates no significant valve  dysfunction. Ascending aorta measures upper limit of normal (3.7 cm).    Holter monitor on [place] [facility] [place]  2016  CONCLUSION:   1. Underlying rhythm is normal sinus rhythm with heart rate range  with   an average heart rate of 64 bpm.   2. Occasional ventricular ectopy with overall burden of <1%.   3. Rare supraventricular ectopy with overall burden of  <1%.   4. No pauses were noted.   5. No sustained arrhythmias were observed.   6. Diary entries for symptoms of palpitations, dizziness correlate with   observed PVCs.       Coronarary angiogram: No results found.      ASSESSMENT:  1. Chest pain, non-exertional with symptoms improving.  Stress test reassuring/negative for ischemia. Suspect this is a part of symptomatic PVCs and not unstable angina.   2. Hypertension, new diagnosis  3. Dyslipidemia    Because symptoms have mostly resolved we will focus on prevention with treatment of BP and repeat lipids.  He already carries a diagnosis of symptomatic PVCs and has a normal EF on his recent stress echo. Offered a statin today given known dyslipidemia, but he would prefer to see his current lab results and try to manage with diet and exercise and follow up.      Will start a BB for treatment of HTN and hopefully to suppress symptomatic PVCs.      RECOMMENDATIONS:  1.  Start metoprolol succinate 25mg by mouth daily.  2.  Have cholesterol labs drawn today and repeat in 3 months.  3.  Follow up in 3 months. Consider starting asa and statin.  4.  Therapeutic lifestyle changes were discussed.     Ina Marin MD [facility]  Text Page

## 2021-12-14 NOTE — PATIENT INSTRUCTIONS
1.  Start metoprolol succinate 25mg by mouth daily.  2.  Have cholesterol labs drawn today and repeat in 3 months.  3.  Follow up in 3 months.

## 2022-06-04 ENCOUNTER — HEALTH MAINTENANCE LETTER (OUTPATIENT)
Age: 52
End: 2022-06-04

## 2022-09-01 ENCOUNTER — TRANSFERRED RECORDS (OUTPATIENT)
Dept: HEALTH INFORMATION MANAGEMENT | Facility: CLINIC | Age: 52
End: 2022-09-01

## 2022-09-01 ENCOUNTER — MEDICAL CORRESPONDENCE (OUTPATIENT)
Dept: HEALTH INFORMATION MANAGEMENT | Facility: CLINIC | Age: 52
End: 2022-09-01

## 2022-09-01 LAB
ALT SERPL-CCNC: 45 IU/L (ref 0–44)
AST SERPL-CCNC: 36 IU/L (ref 0–40)
CHOLESTEROL (EXTERNAL): 225 MG/DL (ref 100–199)
CREATININE (EXTERNAL): 1.17 MG/DL (ref 0.76–1.27)
CREATININE (EXTERNAL): 1.17 MG/DL (ref 0.76–1.27)
GFR ESTIMATED (EXTERNAL): 74 ML/MIN/1.73
GFR ESTIMATED (EXTERNAL): 74 ML/MIN/1.73
GLUCOSE (EXTERNAL): 110 MG/DL (ref 65–99)
GLUCOSE (EXTERNAL): 110 MG/DL (ref 65–99)
HDLC SERPL-MCNC: 36 MG/DL
LDL CHOLESTEROL CALCULATED (EXTERNAL): 172 MG/DL (ref 0–99)
POTASSIUM (EXTERNAL): 4.4 MMOL/L (ref 3.5–5.2)
TRIGLYCERIDES (EXTERNAL): 95 MG/DL (ref 0–149)
TSH SERPL-ACNC: 3.59 UIU/ML (ref 0.45–4.5)

## 2022-09-07 DIAGNOSIS — E78.5 HYPERLIPEMIA: Primary | ICD-10-CM

## 2022-09-08 ENCOUNTER — MEDICAL CORRESPONDENCE (OUTPATIENT)
Dept: HEALTH INFORMATION MANAGEMENT | Facility: CLINIC | Age: 52
End: 2022-09-08

## 2022-09-08 DIAGNOSIS — E78.5 HYPERLIPEMIA: Primary | ICD-10-CM

## 2022-10-09 ENCOUNTER — HEALTH MAINTENANCE LETTER (OUTPATIENT)
Age: 52
End: 2022-10-09

## 2023-01-18 ENCOUNTER — E-VISIT (OUTPATIENT)
Dept: URGENT CARE | Facility: CLINIC | Age: 53
End: 2023-01-18

## 2023-01-18 DIAGNOSIS — J06.9 VIRAL URI: Primary | ICD-10-CM

## 2023-01-18 PROCEDURE — 99421 OL DIG E/M SVC 5-10 MIN: CPT | Performed by: PHYSICIAN ASSISTANT

## 2023-01-19 ENCOUNTER — OFFICE VISIT (OUTPATIENT)
Dept: FAMILY MEDICINE | Facility: CLINIC | Age: 53
End: 2023-01-19
Payer: COMMERCIAL

## 2023-01-19 VITALS
DIASTOLIC BLOOD PRESSURE: 98 MMHG | WEIGHT: 221.8 LBS | OXYGEN SATURATION: 97 % | BODY MASS INDEX: 31.75 KG/M2 | SYSTOLIC BLOOD PRESSURE: 147 MMHG | TEMPERATURE: 98.6 F | RESPIRATION RATE: 18 BRPM | HEIGHT: 70 IN | HEART RATE: 75 BPM

## 2023-01-19 DIAGNOSIS — H92.03 OTALGIA OF BOTH EARS: ICD-10-CM

## 2023-01-19 DIAGNOSIS — J02.9 SORE THROAT: Primary | ICD-10-CM

## 2023-01-19 LAB
DEPRECATED S PYO AG THROAT QL EIA: NEGATIVE
FLUAV AG SPEC QL IA: NEGATIVE
FLUBV AG SPEC QL IA: NEGATIVE
GROUP A STREP BY PCR: NOT DETECTED

## 2023-01-19 PROCEDURE — 87804 INFLUENZA ASSAY W/OPTIC: CPT | Performed by: INTERNAL MEDICINE

## 2023-01-19 PROCEDURE — 87804 INFLUENZA ASSAY W/OPTIC: CPT | Mod: 59 | Performed by: INTERNAL MEDICINE

## 2023-01-19 PROCEDURE — 87651 STREP A DNA AMP PROBE: CPT | Performed by: INTERNAL MEDICINE

## 2023-01-19 PROCEDURE — 99203 OFFICE O/P NEW LOW 30 MIN: CPT | Performed by: INTERNAL MEDICINE

## 2023-01-19 ASSESSMENT — PAIN SCALES - GENERAL: PAINLEVEL: SEVERE PAIN (6)

## 2023-01-19 NOTE — PROGRESS NOTES
"  Assessment & Plan     Sore throat  Negative for strep and influenza  - Streptococcus A Rapid Screen w/Reflex to PCR - Clinic Collect  - Influenza A & B Antigen - Clinic Collect    Otalgia of both ears  No signs of infection on exam.   See Patient Instructions    No follow-ups on file.    MD GARFIELD CANO [facility] SILVINA Mckeon is a 52 year old, presenting for the following health issues:  Pharyngitis and Ear Problem (Possible ear infection)    Mike is here for acute symptoms.   He has sore throat, cough, ear pain for 5 days.   No fever or chills. Covid home antigen test negative.   On exam, ears look normal     History of Present Illness       Reason for visit:  Sore throat , ear infection  Symptom onset:  3-7 days ago  Symptom intensity:  Severe  Symptom progression:  Worsening  Had these symptoms before:  No  What makes it worse:  Laying down or clearing hthroat  What makes it better:  Drinking water    He eats 0-1 servings of fruits and vegetables daily.He consumes 1 sweetened beverage(s) daily.He exercises with enough effort to increase his heart rate 10 to 19 minutes per day.  He exercises with enough effort to increase his heart rate 3 or less days per week.   He is taking medications regularly.     Review of Systems       Objective    BP (!) 147/98 (BP Location: Right arm, Patient Position: Sitting, Cuff Size: Adult Large)   Pulse 75   Temp 98.6  F (37  C) (Tympanic)   Resp 18   Ht 1.778 m (5' 10\")   Wt 100.6 kg (221 lb 12.8 oz)   SpO2 97%   BMI 31.82 kg/m    Body mass index is 31.82 kg/m .  Physical Exam           "

## 2023-01-19 NOTE — PATIENT INSTRUCTIONS
The symptoms you describe suggest a viral cause, which is much more common than a bacterial cause. Antibiotics will treat bacterial infections, but have no effect on viral infections. If possible, especially if improving, start with symptom care for the first 7-10 days, then consider seeking further treatment or taking an antibiotic. Bacterial infections generally are more severe, including symptoms such as pus, fever over 101degrees F, or rapidly worsening.    Ralph Mckeon,    I'm sorry to hear you are not feeling well!  Ear pain can be due to congestion or a sore throat.  I recommend Mucinex D for congestion, Robitussin DM for cough, and ibuprofen or tylenol for pain/fever/headache/sore throat to treat your symptoms.  Make sure you are using medications like these to help your symptoms. Your immune system is hard at work trying to get you better. The average virus lasts about 7-10 days.  Hope you feel better soon!    Caryl Beltrán PA-C, [facility]  1/18/2023  11:45 PM

## 2023-01-20 ENCOUNTER — E-VISIT (OUTPATIENT)
Dept: FAMILY MEDICINE | Facility: CLINIC | Age: 53
End: 2023-01-20
Payer: COMMERCIAL

## 2023-01-20 DIAGNOSIS — J02.9 ACUTE SORE THROAT: Primary | ICD-10-CM

## 2023-01-20 PROCEDURE — 99207 PR NON-BILLABLE SERV PER CHARTING: CPT | Performed by: INTERNAL MEDICINE

## 2023-01-21 ENCOUNTER — E-VISIT (OUTPATIENT)
Dept: URGENT CARE | Facility: CLINIC | Age: 53
End: 2023-01-21
Payer: COMMERCIAL

## 2023-01-21 DIAGNOSIS — J20.8 ACUTE BACTERIAL BRONCHITIS: Primary | ICD-10-CM

## 2023-01-21 DIAGNOSIS — B96.89 ACUTE BACTERIAL BRONCHITIS: Primary | ICD-10-CM

## 2023-01-21 PROCEDURE — 99421 OL DIG E/M SVC 5-10 MIN: CPT | Performed by: PHYSICIAN ASSISTANT

## 2023-01-21 RX ORDER — AZITHROMYCIN 250 MG/1
TABLET, FILM COATED ORAL
Qty: 6 TABLET | Refills: 0 | Status: SHIPPED | OUTPATIENT
Start: 2023-01-21 | End: 2023-01-26

## 2023-01-21 NOTE — PATIENT INSTRUCTIONS
"    Dear Nitin Samuels    After reviewing your responses, I've been able to diagnose you with \"Bronchitis\" which is a common infection of your lungs. While this is most commonly caused by a virus, the symptoms you have given suggest you should be treated with antibiotics.     I have sent zpak to your pharmacy to treat this infection.     It is important that you take all of your prescribed medication even if your symptoms are improving after a few doses. Taking all of your medicine helps prevent the symptoms from returning.     If your symptoms worsen, you develop chest pain or shortness of breath, fevers over 101, or are not improving in 5 days, please contact your primary care provider for an appointment or visit any of our convenient Walk-in Care or Urgent Care Centers to be seen which can be found on our website here.    Thanks again for choosing us as your health care partner,    Derrick Trimble [facility], PA-C    "

## 2023-01-23 NOTE — TELEPHONE ENCOUNTER
No ans to double book on Dr. Wild's schedule today Monday.  LM to call clinic.    I see pt had another E-visit with [facility] on 1-21-23 and was Rx'ed WILBER-marquita BILLS MA

## 2023-06-10 ENCOUNTER — HEALTH MAINTENANCE LETTER (OUTPATIENT)
Age: 53
End: 2023-06-10

## 2023-09-06 ENCOUNTER — MEDICAL CORRESPONDENCE (OUTPATIENT)
Dept: HEALTH INFORMATION MANAGEMENT | Facility: CLINIC | Age: 53
End: 2023-09-06
Payer: COMMERCIAL

## 2023-09-06 DIAGNOSIS — E78.00 ELEVATED CHOLESTEROL: Primary | ICD-10-CM

## 2023-09-06 DIAGNOSIS — Z82.49 FAMILY HISTORY OF CORONARY ARTERY DISEASE: ICD-10-CM

## 2023-09-13 ENCOUNTER — HOSPITAL ENCOUNTER (OUTPATIENT)
Dept: CARDIOLOGY | Facility: CLINIC | Age: 53
Discharge: HOME | End: 2023-09-13
Admitting: RADIOLOGY
Payer: COMMERCIAL

## 2023-09-13 DIAGNOSIS — E78.00 ELEVATED CHOLESTEROL: ICD-10-CM

## 2023-09-13 DIAGNOSIS — Z82.49 FAMILY HISTORY OF CORONARY ARTERY DISEASE: ICD-10-CM

## 2023-09-13 PROCEDURE — 75571 CT HRT W/O DYE W/CA TEST: CPT | Mod: GA

## 2023-09-13 PROCEDURE — 75571 CT HRT W/O DYE W/CA TEST: CPT | Mod: 26 | Performed by: INTERNAL MEDICINE

## 2024-05-21 ENCOUNTER — HOSPITAL ENCOUNTER (OUTPATIENT)
Facility: CLINIC | Age: 54
Setting detail: OBSERVATION
Discharge: HOME | End: 2024-05-22
Attending: EMERGENCY MEDICINE | Admitting: INTERNAL MEDICINE
Payer: COMMERCIAL

## 2024-05-21 ENCOUNTER — APPOINTMENT (OUTPATIENT)
Dept: GENERAL RADIOLOGY | Facility: CLINIC | Age: 54
End: 2024-05-21
Attending: EMERGENCY MEDICINE
Payer: COMMERCIAL

## 2024-05-21 DIAGNOSIS — K20.0 EOSINOPHILIC ESOPHAGITIS: Primary | ICD-10-CM

## 2024-05-21 DIAGNOSIS — R07.9 CHEST PAIN, UNSPECIFIED TYPE: ICD-10-CM

## 2024-05-21 LAB
ALBUMIN SERPL BCG-MCNC: 4.4 G/DL (ref 3.5–5.2)
ALP SERPL-CCNC: 68 U/L (ref 40–150)
ALT SERPL W P-5'-P-CCNC: 40 U/L (ref 0–70)
ANION GAP SERPL CALCULATED.3IONS-SCNC: 13 MMOL/L (ref 7–15)
AST SERPL W P-5'-P-CCNC: 30 U/L (ref 0–45)
BASOPHILS # BLD AUTO: 0 10E3/UL (ref 0–0.2)
BASOPHILS NFR BLD AUTO: 0 %
BILIRUB SERPL-MCNC: 0.4 MG/DL
BUN SERPL-MCNC: 14.7 MG/DL (ref 6–20)
CALCIUM SERPL-MCNC: 9.2 MG/DL (ref 8.6–10)
CHLORIDE SERPL-SCNC: 107 MMOL/L (ref 98–107)
CREAT SERPL-MCNC: 0.98 MG/DL (ref 0.67–1.17)
DEPRECATED HCO3 PLAS-SCNC: 20 MMOL/L (ref 22–29)
EGFRCR SERPLBLD CKD-EPI 2021: >90 ML/MIN/1.73M2
EOSINOPHIL # BLD AUTO: 0.2 10E3/UL (ref 0–0.7)
EOSINOPHIL NFR BLD AUTO: 2 %
ERYTHROCYTE [DISTWIDTH] IN BLOOD BY AUTOMATED COUNT: 12.6 % (ref 10–15)
GLUCOSE SERPL-MCNC: 135 MG/DL (ref 70–99)
HCT VFR BLD AUTO: 45 % (ref 40–53)
HGB BLD-MCNC: 15.6 G/DL (ref 13.3–17.7)
IMM GRANULOCYTES # BLD: 0 10E3/UL
IMM GRANULOCYTES NFR BLD: 0 %
LIPASE SERPL-CCNC: 26 U/L (ref 13–60)
LYMPHOCYTES # BLD AUTO: 2.6 10E3/UL (ref 0.8–5.3)
LYMPHOCYTES NFR BLD AUTO: 24 %
MCH RBC QN AUTO: 32.9 PG (ref 26.5–33)
MCHC RBC AUTO-ENTMCNC: 34.7 G/DL (ref 31.5–36.5)
MCV RBC AUTO: 95 FL (ref 78–100)
MONOCYTES # BLD AUTO: 0.9 10E3/UL (ref 0–1.3)
MONOCYTES NFR BLD AUTO: 8 %
NEUTROPHILS # BLD AUTO: 7.1 10E3/UL (ref 1.6–8.3)
NEUTROPHILS NFR BLD AUTO: 66 %
NRBC # BLD AUTO: 0 10E3/UL
NRBC BLD AUTO-RTO: 0 /100
PLATELET # BLD AUTO: 205 10E3/UL (ref 150–450)
POTASSIUM SERPL-SCNC: 3.4 MMOL/L (ref 3.4–5.3)
PROT SERPL-MCNC: 7.1 G/DL (ref 6.4–8.3)
RBC # BLD AUTO: 4.74 10E6/UL (ref 4.4–5.9)
SODIUM SERPL-SCNC: 140 MMOL/L (ref 135–145)
TROPONIN T SERPL HS-MCNC: 11 NG/L
WBC # BLD AUTO: 10.9 10E3/UL (ref 4–11)

## 2024-05-21 PROCEDURE — 80053 COMPREHEN METABOLIC PANEL: CPT | Performed by: EMERGENCY MEDICINE

## 2024-05-21 PROCEDURE — 71046 X-RAY EXAM CHEST 2 VIEWS: CPT

## 2024-05-21 PROCEDURE — 83690 ASSAY OF LIPASE: CPT | Performed by: EMERGENCY MEDICINE

## 2024-05-21 PROCEDURE — 93005 ELECTROCARDIOGRAM TRACING: CPT

## 2024-05-21 PROCEDURE — 83036 HEMOGLOBIN GLYCOSYLATED A1C: CPT | Performed by: PHYSICIAN ASSISTANT

## 2024-05-21 PROCEDURE — 85025 COMPLETE CBC W/AUTO DIFF WBC: CPT | Performed by: EMERGENCY MEDICINE

## 2024-05-21 PROCEDURE — 99285 EMERGENCY DEPT VISIT HI MDM: CPT | Mod: 25

## 2024-05-21 PROCEDURE — 84484 ASSAY OF TROPONIN QUANT: CPT | Performed by: EMERGENCY MEDICINE

## 2024-05-21 PROCEDURE — 36415 COLL VENOUS BLD VENIPUNCTURE: CPT | Performed by: EMERGENCY MEDICINE

## 2024-05-21 ASSESSMENT — ACTIVITIES OF DAILY LIVING (ADL): ADLS_ACUITY_SCORE: 35

## 2024-05-22 ENCOUNTER — APPOINTMENT (OUTPATIENT)
Dept: CARDIOLOGY | Facility: CLINIC | Age: 54
End: 2024-05-22
Attending: INTERNAL MEDICINE
Payer: COMMERCIAL

## 2024-05-22 VITALS
DIASTOLIC BLOOD PRESSURE: 79 MMHG | BODY MASS INDEX: 31.55 KG/M2 | SYSTOLIC BLOOD PRESSURE: 127 MMHG | TEMPERATURE: 98.2 F | WEIGHT: 219.9 LBS | HEART RATE: 62 BPM | OXYGEN SATURATION: 95 % | RESPIRATION RATE: 18 BRPM

## 2024-05-22 PROBLEM — R07.9 CHEST PAIN, UNSPECIFIED TYPE: Status: ACTIVE | Noted: 2024-05-22

## 2024-05-22 LAB
ATRIAL RATE - MUSE: 84 BPM
CHOLEST SERPL-MCNC: 112 MG/DL
DIASTOLIC BLOOD PRESSURE - MUSE: NORMAL MMHG
FASTING STATUS PATIENT QL REPORTED: NO
HBA1C MFR BLD: 5.5 %
HDLC SERPL-MCNC: 35 MG/DL
INTERPRETATION ECG - MUSE: NORMAL
LDLC SERPL CALC-MCNC: 55 MG/DL
NONHDLC SERPL-MCNC: 77 MG/DL
P AXIS - MUSE: 44 DEGREES
PR INTERVAL - MUSE: 210 MS
QRS DURATION - MUSE: 110 MS
QT - MUSE: 392 MS
QTC - MUSE: 463 MS
R AXIS - MUSE: 9 DEGREES
SYSTOLIC BLOOD PRESSURE - MUSE: NORMAL MMHG
T AXIS - MUSE: 33 DEGREES
TRIGL SERPL-MCNC: 108 MG/DL
TROPONIN T SERPL HS-MCNC: 17 NG/L
TROPONIN T SERPL HS-MCNC: 20 NG/L
TROPONIN T SERPL HS-MCNC: 21 NG/L
VENTRICULAR RATE- MUSE: 84 BPM

## 2024-05-22 PROCEDURE — 36415 COLL VENOUS BLD VENIPUNCTURE: CPT | Performed by: EMERGENCY MEDICINE

## 2024-05-22 PROCEDURE — 36415 COLL VENOUS BLD VENIPUNCTURE: CPT | Performed by: INTERNAL MEDICINE

## 2024-05-22 PROCEDURE — 93325 DOPPLER ECHO COLOR FLOW MAPG: CPT | Mod: TC

## 2024-05-22 PROCEDURE — 93321 DOPPLER ECHO F-UP/LMTD STD: CPT | Mod: 26 | Performed by: INTERNAL MEDICINE

## 2024-05-22 PROCEDURE — 250N000013 HC RX MED GY IP 250 OP 250 PS 637: Performed by: INTERNAL MEDICINE

## 2024-05-22 PROCEDURE — G0378 HOSPITAL OBSERVATION PER HR: HCPCS

## 2024-05-22 PROCEDURE — 250N000011 HC RX IP 250 OP 636: Performed by: INTERNAL MEDICINE

## 2024-05-22 PROCEDURE — 84484 ASSAY OF TROPONIN QUANT: CPT | Performed by: EMERGENCY MEDICINE

## 2024-05-22 PROCEDURE — 84484 ASSAY OF TROPONIN QUANT: CPT | Mod: 91 | Performed by: INTERNAL MEDICINE

## 2024-05-22 PROCEDURE — 93325 DOPPLER ECHO COLOR FLOW MAPG: CPT | Mod: 26 | Performed by: INTERNAL MEDICINE

## 2024-05-22 PROCEDURE — 93350 STRESS TTE ONLY: CPT | Mod: TC

## 2024-05-22 PROCEDURE — C9113 INJ PANTOPRAZOLE SODIUM, VIA: HCPCS | Performed by: INTERNAL MEDICINE

## 2024-05-22 PROCEDURE — 93016 CV STRESS TEST SUPVJ ONLY: CPT | Performed by: INTERNAL MEDICINE

## 2024-05-22 PROCEDURE — 99236 HOSP IP/OBS SAME DATE HI 85: CPT | Performed by: INTERNAL MEDICINE

## 2024-05-22 PROCEDURE — 80061 LIPID PANEL: CPT | Performed by: INTERNAL MEDICINE

## 2024-05-22 PROCEDURE — 93350 STRESS TTE ONLY: CPT | Mod: 26 | Performed by: INTERNAL MEDICINE

## 2024-05-22 PROCEDURE — 93018 CV STRESS TEST I&R ONLY: CPT | Performed by: INTERNAL MEDICINE

## 2024-05-22 PROCEDURE — 99207 PR APP CREDIT; MD BILLING SHARED VISIT: CPT | Performed by: PHYSICIAN ASSISTANT

## 2024-05-22 PROCEDURE — 96374 THER/PROPH/DIAG INJ IV PUSH: CPT

## 2024-05-22 RX ORDER — NITROGLYCERIN 0.4 MG/1
0.4 TABLET SUBLINGUAL EVERY 5 MIN PRN
Status: DISCONTINUED | OUTPATIENT
Start: 2024-05-22 | End: 2024-05-22 | Stop reason: HOSPADM

## 2024-05-22 RX ORDER — ROSUVASTATIN CALCIUM 20 MG/1
20 TABLET, COATED ORAL AT BEDTIME
COMMUNITY

## 2024-05-22 RX ORDER — AMLODIPINE BESYLATE 5 MG/1
5 TABLET ORAL DAILY
Qty: 30 TABLET | Refills: 1 | Status: SHIPPED | OUTPATIENT
Start: 2024-05-23

## 2024-05-22 RX ORDER — ACETAMINOPHEN 325 MG/1
650 TABLET ORAL EVERY 4 HOURS PRN
Status: DISCONTINUED | OUTPATIENT
Start: 2024-05-22 | End: 2024-05-22 | Stop reason: HOSPADM

## 2024-05-22 RX ORDER — ATORVASTATIN CALCIUM 40 MG/1
40 TABLET, FILM COATED ORAL EVERY EVENING
Status: DISCONTINUED | OUTPATIENT
Start: 2024-05-22 | End: 2024-05-22

## 2024-05-22 RX ORDER — ROSUVASTATIN CALCIUM 20 MG/1
20 TABLET, COATED ORAL AT BEDTIME
Status: DISCONTINUED | OUTPATIENT
Start: 2024-05-22 | End: 2024-05-22 | Stop reason: HOSPADM

## 2024-05-22 RX ORDER — NALOXONE HYDROCHLORIDE 0.4 MG/ML
0.2 INJECTION, SOLUTION INTRAMUSCULAR; INTRAVENOUS; SUBCUTANEOUS
Status: DISCONTINUED | OUTPATIENT
Start: 2024-05-22 | End: 2024-05-22 | Stop reason: HOSPADM

## 2024-05-22 RX ORDER — MORPHINE SULFATE 2 MG/ML
1 INJECTION, SOLUTION INTRAMUSCULAR; INTRAVENOUS
Status: DISCONTINUED | OUTPATIENT
Start: 2024-05-22 | End: 2024-05-22 | Stop reason: HOSPADM

## 2024-05-22 RX ORDER — MORPHINE SULFATE 2 MG/ML
2 INJECTION, SOLUTION INTRAMUSCULAR; INTRAVENOUS
Status: DISCONTINUED | OUTPATIENT
Start: 2024-05-22 | End: 2024-05-22 | Stop reason: HOSPADM

## 2024-05-22 RX ORDER — LIDOCAINE 40 MG/G
CREAM TOPICAL
Status: DISCONTINUED | OUTPATIENT
Start: 2024-05-22 | End: 2024-05-22 | Stop reason: HOSPADM

## 2024-05-22 RX ORDER — NALOXONE HYDROCHLORIDE 0.4 MG/ML
0.4 INJECTION, SOLUTION INTRAMUSCULAR; INTRAVENOUS; SUBCUTANEOUS
Status: DISCONTINUED | OUTPATIENT
Start: 2024-05-22 | End: 2024-05-22 | Stop reason: HOSPADM

## 2024-05-22 RX ORDER — FLUTICASONE PROPIONATE 220 UG/1
1 AEROSOL, METERED RESPIRATORY (INHALATION) 2 TIMES DAILY
Qty: 12 G | Refills: 1 | Status: SHIPPED | OUTPATIENT
Start: 2024-05-22

## 2024-05-22 RX ORDER — MAGNESIUM HYDROXIDE/ALUMINUM HYDROXICE/SIMETHICONE 120; 1200; 1200 MG/30ML; MG/30ML; MG/30ML
30 SUSPENSION ORAL EVERY 4 HOURS PRN
Status: DISCONTINUED | OUTPATIENT
Start: 2024-05-22 | End: 2024-05-22 | Stop reason: HOSPADM

## 2024-05-22 RX ORDER — ACETAMINOPHEN 650 MG/1
650 SUPPOSITORY RECTAL EVERY 4 HOURS PRN
Status: DISCONTINUED | OUTPATIENT
Start: 2024-05-22 | End: 2024-05-22 | Stop reason: HOSPADM

## 2024-05-22 RX ORDER — ASPIRIN 81 MG/1
81 TABLET ORAL AT BEDTIME
Status: DISCONTINUED | OUTPATIENT
Start: 2024-05-22 | End: 2024-05-22 | Stop reason: HOSPADM

## 2024-05-22 RX ORDER — PANTOPRAZOLE SODIUM 40 MG/1
40 TABLET, DELAYED RELEASE ORAL DAILY
Qty: 30 TABLET | Refills: 0 | Status: SHIPPED | OUTPATIENT
Start: 2024-05-22

## 2024-05-22 RX ORDER — ASPIRIN 81 MG/1
81 TABLET ORAL DAILY
Qty: 30 TABLET | Refills: 3 | Status: SHIPPED | OUTPATIENT
Start: 2024-05-22

## 2024-05-22 RX ORDER — AMLODIPINE BESYLATE 5 MG/1
5 TABLET ORAL DAILY
Status: DISCONTINUED | OUTPATIENT
Start: 2024-05-22 | End: 2024-05-22 | Stop reason: HOSPADM

## 2024-05-22 RX ADMIN — PANTOPRAZOLE SODIUM 40 MG: 40 INJECTION, POWDER, FOR SOLUTION INTRAVENOUS at 08:57

## 2024-05-22 RX ADMIN — AMLODIPINE BESYLATE 5 MG: 5 TABLET ORAL at 08:57

## 2024-05-22 ASSESSMENT — ACTIVITIES OF DAILY LIVING (ADL)
ADLS_ACUITY_SCORE: 35
ADLS_ACUITY_SCORE: 35
ADLS_ACUITY_SCORE: 18
ADLS_ACUITY_SCORE: 35
ADLS_ACUITY_SCORE: 18
ADLS_ACUITY_SCORE: 35
ADLS_ACUITY_SCORE: 18
ADLS_ACUITY_SCORE: 18

## 2024-05-22 NOTE — PROGRESS NOTES
[id number]  Alert and oriented times four  Room air   Independent  Denies pain  Denies N/V  Tele: SR  Plan: continue to monitor and echo

## 2024-05-22 NOTE — PLAN OF CARE
Goal Outcome Evaluation:    Pt. Discharged to home with significant other Ana Maria. Discharge instructions/AVS reviewed with pt. And significant other at bedside. Pt. And significant other verbalize understanding of instructions. Discharge medications to be picked up at [facility] in [place].

## 2024-05-22 NOTE — PROGRESS NOTES
RECEIVING UNIT ED HANDOFF REVIEW    ED Nurse Handoff Report was reviewed by: Irais Patel RN on May 22, 2024 at 3:05 AM

## 2024-05-22 NOTE — DISCHARGE SUMMARY
[facility]  Hospitalist Discharge Summary      Date of Admission:  5/21/2024  Date of Discharge:  5/22/2024  Discharging Provider: Terra Ordoñez PA-C  Discharge Service: Hospitalist Service    Discharge Diagnoses   Chest pressure, ACS ruled out.  Elevated coronary calcium score  Eosinophilic esophagitis  GERD  Hx dysphagia  Hypertension  PVCs  Hx Panic disorder without agoraphobia    Clinically Significant Risk Factors          Follow-ups Needed After Discharge   Follow-up Appointments     Follow-up and recommended labs and tests       Follow up with primary care provider, FRANSICO HERNANDEZ, within 7 days to   evaluate medication change, for hospital follow- up, and to follow up on   results.  The following labs/tests are recommended: follow up on lipid   panel and increase statin to 40mg/d if appropriate, monitor amlodipine   dosing as well as tolerance to ASA (PPI ordered). GI symptoms.     Follow up with cardiology and establish care with [facility].            Unresulted Labs Ordered in the Past 30 Days of this Admission       Date and Time Order Name Status Description    5/22/2024  3:45 AM Lipid panel reflex to direct LDL In process         These results will be followed up by PCP/Cardiology    Discharge Disposition   Discharged to home  Condition at discharge: Stable    Hospital Course   Nitin Samuels is a 53 year old male admitted on 5/21/2024. He presents to the emergency department for evaluation of central chest pressure improved with nitroglycerin.  Some nausea and diaphoresis with EMS. For full HPI please see admission H&P from Dr. Derrick Gonzalez dated 5/22/24.     Chest pressure, ACS ruled out.  Elevated coronary calcium score  *September 2023 CT coronary calcium score of 421 placing him in the 95th age-matched percentile.  , circumflex 166, RCA 87.6.  Family history of early coronary artery disease in his father 7 stents in 60s, Uncle 11 stents in 60s, and  mother with cardiomegaly and AAA, (as well as maternal uncle and cousin).  Non-smoker but 20 years of 2nd hand smoke exposure.   *Presented with onset of diffuse chest pressure while laying down at rest (was awake), somewhat reminiscent of GERD, but more significant and across his entire chest.  Persisted for over 20 minutes before EMS was contacted, improved somewhat with nitroglycerin spray.  Some nausea and diaphoresis with EMS.  Troponin trend x4 within normal limits, but did have a slight increase between troponin 1 and 2 from 11-->21.  Reports 1 week ago he had more fatigue, dyspnea, and diaphoresis than he would have anticipated after mowing his small lawn.    Registered to observation for expedited stress test which was negative for inducible ischemia. No significant arrhythmias on cardiac monitoring. Noted some recurrence of the discomfort after eating, reminiscent of GERD/esophogeal spasms. Started on ASA 81mg/d. Repeated lipid panel with history of hyperlipidemia, results pending on discharge and should be followed up by PCP/Cardiology. Continue PTA rosuvastatin 20mg/d and consider increasing to 40mg/d pending lipid panel results.   Discharged on new prescriptions - ASA 81mg with pantoprazole as well as continued statin and low dose amlodipine 5mg/d. Follow up with PCP and cardiology JAYCEE to discuss further medications and monitoring in setting of risk factors, family history (father 7 coronary stents, uncle 11 stents, mother with cardiomegaly and AAA). Discussed in detail with patient and wife, diet and lifestyle factors and following up to reduce risk of coronary event.      Eosinophilic esophagitis  GERD  Hx dysphagia  No longer using Flovent inhaler or scheduled PPI therapy.  Describes occasional over-the-counter 14-day course of omeprazole for GERD symptoms.  He does report some issues with dysphagia concerning for recurrence of eosinophilic esophagitis. Not established with GI yet here in [place],  previously followed in a different state. Discharged with PPI and Flovent inhaler as well as [facility] referral as it appears that he was largely lost to follow-up after 2019 EGD with dilation at that time. Monitor for evidence of overt dysphagia. GI could consider esophagram as an outpatient.     Hx panic disorder without agoraphobia: Previously followed through [facility]ry in [facility] and had been on Zoloft in the past. Follow up with PCP.     Hypertension  PVCs  Prior diagnosis and was initiated on metoprolol in 2021 for symptomatic PVCs, but self discontinued at some point later as he was not sure that this was helping him.  Though beta-blockade might help him with his PVCs as well as panic disorder, will trial amlodipine with his esophageal dysmotility history, may also be beneficial for PVCs. Resting HR during hospitalization 50s-60s so would limit use of beta blockade. Initiated on and discharged with script for amlodipine 5 mg daily. Check BP as outpatient and titrate with PCP/Cardiology.    Consultations This Hospital Stay   None    Code Status   Full Code    Time Spent on this Encounter   I, Terra Ordoñez PA-C, personally saw the patient today and spent greater than 30 minutes discharging this patient.       Terra Ordoñez PA-C  [facility]  [address] [place] [address] [address]  [facility] [address]  Phone: [phone]  ______________________________________________________________________    Physical Exam   Vital Signs: Temp: 98.2  F (36.8  C) Temp src: Oral BP: 127/79 Pulse: 62   Resp: 18 SpO2: 95 % O2 Device: None (Room air)    Weight: 219 lbs 14.4 oz    Physical Exam  Vitals reviewed.   Constitutional:       General: He is not in acute distress.     Appearance: Normal appearance.   HENT:      Head: Normocephalic and atraumatic.   Eyes:      Extraocular Movements: Extraocular movements intact.   Cardiovascular:      Rate and Rhythm: Normal rate  and regular rhythm.      Pulses: Normal pulses.      Heart sounds: Normal heart sounds. No murmur heard.  Abdominal:      General: Bowel sounds are normal. There is no distension.      Palpations: Abdomen is soft.      Tenderness: There is no abdominal tenderness.   Musculoskeletal:         General: Normal range of motion.      Right lower leg: No edema.      Left lower leg: No edema.   Skin:     General: Skin is warm and dry.   Neurological:      Mental Status: He is alert and oriented to person, place, and time.   Psychiatric:         Behavior: Behavior normal.                Primary Care Physician   FRANSICO HERNANDEZ    Discharge Orders      Adult GI  Referral - Consult Only      Follow-Up with Cardiology JAYCEE      Reason for your hospital stay    You were admitted with chest pain. You had a normal stress test. This is unlikely to be related to your heart.     Follow-up and recommended labs and tests     Follow up with primary care provider, FRANSICO HERNANDEZ, within 7 days to evaluate medication change, for hospital follow- up, and to follow up on results.  The following labs/tests are recommended: follow up on lipid panel and increase statin to 40mg/d if appropriate, monitor amlodipine dosing as well as tolerance to ASA (PPI ordered). GI symptoms.     Follow up with cardiology and establish care with [facility].     Activity    Your activity upon discharge: activity as tolerated     Monitor and record    blood pressure a few times per week as able and bring record to next appointment     Discharge Instructions    Focus on diet and lifestyle changes to limit your risk of heart issues  Take aspirin, monitor for any bleeding, and make sure you take your pantoprazole when you're on aspirin given your history of reflux and EE  Limit alcohol  Exercise as tolerated  Increase intake of fruit, veggies, whole grains, and lean meats  Limit excessive saturated fat, fried and packaged high sodium foods as able     Diet     Follow this diet upon discharge:  Combination Diet Low Saturated Fat Na <2400mg Diet       Significant Results and Procedures   Results for orders placed or performed during the hospital encounter of 24   XR Chest 2 Views    Narrative    EXAM: XR CHEST 2 VIEWS  LOCATION: [facility]  DATE: 2024    INDICATION: chest pain  COMPARISON: 2021      Impression    IMPRESSION: Normal heart size. Lungs are clear. No pneumothorax or pleural effusion.   Echo Stress Echocardiogram    Narrative    [id number]  [facility]  [id number]  [id number]^SULEMAN^YANET^ANALI     [facility]  Echocardiography Laboratory  [address] [place] [id number]     Name: VICTOR M MATA  MRN: [id number]  : 1970  Study Date: 2024 08:01 AM  Age: 53 yrs  Gender: Male  Patient Location: [facility]  Reason For Study: Chest Pressure  Ordering Physician: YANET SHELL  Referring Physician: Rohit Longoria  Performed By: Irina Brush     BSA: 2.2 m2  Height: 70 in  Weight: 219 lb  HR: 60  BP: 129/95 mmHg  ______________________________________________________________________________  Procedure  Stress Echo Complete.  ______________________________________________________________________________  Interpretation Summary  1. Exercise stress echocardiogram study is negative for ischemia at a  diagnostic level of peak stress (87% MPHR).  2. EKG at rest and with stress demonstrated no ischemic ST changes. Occasional  PVCs with exercise.  3. Chest heaviness developed within 2 minutes of recovery. No symptoms during  peak exertion.  4. Normal blood pressure response to exercise.  5. Above average functional capacity for age.  6. Baseline screening echocardiogram demonstrated no significant valve  dysfunction. Borderline dilated ascending aorta (3.9 cm).  ______________________________________________________________________________  Stress  The patient exercised 9:15 min.  A moderately-high  workload was achieved.  RPP 79374.  Exercise was stopped due to fatigue.  There was a normal BP response to exercise.  2/10 chest heaviness within 2 minutes of recovery.  A treadmill exercise test according to the Darian protocol was performed.  Target Heart Rate was achieved.  There were no ST segment changes observed with stress.  Normal left ventricular function and wall motion at rest and post-stress.  The visual ejection fraction is >70%.  Left ventricular cavity size decreases with exercise.  Global LV systolic function augments with exercise.     Baseline  Normal baseline electrocardiogram.  No arrhythmia noted.  Normal left ventricular function and wall motion at rest.  The visual ejection fraction is estimated at 60-65%.     Stress Results         Protocol:  Darian Protocol        Maximum Predicted HR:   165 bpm         Target HR: 140 bpm               % Maximum Predicted HR: 87 %              Duration  Heart    Stage   (mm:ss)  Rate    BP                      Comment                     (bpm)   Stage 1   3:00     94   140/90   Stage 2   3:00     123  158/90   Stage 3   3:00     141  174/90   Stage 4   0:15     144    /   FAC: average, Duke score: 9 (low risk)             5:00     88   142/791-2/10 chest heaviness onset at 2 min post  RecoveryR                      exercise, resolved by 6 min post             Stress Duration:   9:15 mm:ss        Recovery Time: 5:00 mm:ss          Maximum Stress HR: 144 bpm           METS:          11  ______________________________________________________________________________  MMode/2D Measurements & Calculations  IVSd: 1.1 cm  LVIDd: 5.3 cm  LVIDs: 3.0 cm  LVPWd: 1.0 cm  FS: 43.5 %  LV mass(C)dI: 97.6 grams/m2  asc Aorta Diam: 3.9 cm  Asc Ao diam index BSA (cm/m2): 1.8  Asc Ao diam index Ht(cm/m): 2.2  RWT: 0.38     Doppler Measurements & Calculations  PA acc time: 0.17 sec     ______________________________________________________________________________  Report approved  by: Ba Medina 05/22/2024 10:48 AM             Discharge Medications   Current Discharge Medication List        START taking these medications    Details   amLODIPine (NORVASC) 5 MG tablet Take 1 tablet (5 mg) by mouth daily  Qty: 30 tablet, Refills: 1    Associated Diagnoses: Chest pain, unspecified type      aspirin 81 MG EC tablet Take 1 tablet (81 mg) by mouth daily  Qty: 30 tablet, Refills: 3    Associated Diagnoses: Chest pain, unspecified type      fluticasone (FLOVENT HFA) 220 MCG/ACT inhaler Inhale 1 puff into the lungs 2 times daily  Qty: 12 g, Refills: 1    Comments: Pharmacy may dispense brand if preferred by insurance.  Associated Diagnoses: Eosinophilic esophagitis      pantoprazole (PROTONIX) 40 MG EC tablet Take 1 tablet (40 mg) by mouth daily  Qty: 30 tablet, Refills: 0    Associated Diagnoses: Chest pain, unspecified type           CONTINUE these medications which have NOT CHANGED    Details   rosuvastatin (CRESTOR) 20 MG tablet Take 20 mg by mouth at bedtime           Allergies   Allergies   Allergen Reactions    Apple Juice Other (See Comments)     Mouth itches, lips swell up    Cherry     Pcn [Penicillins] Unknown     Childhood reaction    Peach [Prunus Persica]     Plum Pulp

## 2024-05-22 NOTE — PLAN OF CARE
Goal Outcome Evaluation:    VSS. Monitor shows Sinus rhythm. Pt. Has been painfree. Plan for discharge to home today.

## 2024-05-22 NOTE — ED TRIAGE NOTES
BIBA from home due to chest pain and nausea-reported more of pressure and had felt pounding of his heart  when he moves.  Mentioned that had 5 mg THC but does not think it is related to his chest pain.     Triage Assessment (Adult)       Row Name 05/21/24 9586          Triage Assessment    Airway WDL WDL        Respiratory WDL    Respiratory WDL WDL        Skin Circulation/Temperature WDL    Skin Circulation/Temperature WDL WDL        Cardiac WDL    Cardiac WDL WDL;chest pain        Chest Pain Assessment    Chest Pain Location midsternal     Character pressure     Precipitating Factors at rest     Chest Pain Intervention cardiac biomarkers drawn;cardiac monitoring continued;cardiac monitor placed;12-lead ECG obtained;aspirin given;other (see comments)  NTG SPRAY        Peripheral/Neurovascular WDL    Peripheral Neurovascular WDL WDL        Cognitive/Neuro/Behavioral WDL    Cognitive/Neuro/Behavioral WDL WDL

## 2024-05-22 NOTE — PHARMACY-ADMISSION MEDICATION HISTORY
Pharmacist Admission Medication History    Admission medication history is complete. The information provided in this note is only as accurate as the sources available at the time of the update.    Information Source(s): Patient and CareEverywhere/SureScripts via in-person    Pertinent Information:     Changes made to PTA medication list:  Added: crestor  Deleted: atarax, toprol-xl  Changed: None    Allergies reviewed with patient and updates made in EHR: yes    Medication History Completed By: Marguerite Burciaga RPH 5/22/2024 8:02 AM    PTA Med List   Medication Sig Last Dose    rosuvastatin (CRESTOR) 20 MG tablet Take 20 mg by mouth at bedtime 5/20/2024

## 2024-05-22 NOTE — ED NOTES
[facility]  ED Nurse Handoff Report    ED Chief complaint: Chest Pain and Nausea      ED Diagnosis:   Final diagnoses:   Chest pain, unspecified type       Code Status:to be addressed    Allergies:   Allergies   Allergen Reactions    Apple Juice Other (See Comments)     Mouth itches, lips swell up    Pcn [Penicillins]        Patient Story: presented for evaluation of sudden chest pain and nausea  Focused Assessment:  not distress,vitally stable,at times bradycardic,alert,no pain since arrival.    Treatments and/or interventions provided: was given  mg,NTG spray 3x,zofran 4 mg IV by EMS,IV access,labs.  Patient's response to treatments and/or interventions: tolerated    To be done/followed up on inpatient unit:  as per admission    Does this patient have any cognitive concerns?:  none    Activity level - Baseline/Home:  Independent  Activity Level - Current:   Independent    Patient's Preferred language: English   Needed?: No    Isolation: None  Infection: Not Applicable  Patient tested for COVID 19 prior to admission: NO  Bariatric?: No    Vital Signs:   Vitals:    05/21/24 2231 05/22/24 0004   BP: (!) 133/95 (!) 124/94   Pulse: 80 79   Resp: 24 22   Temp: 98  F (36.7  C)    TempSrc: Oral    SpO2: 95%        Cardiac Rhythm:     Was the PSS-3 completed:   Yes  What interventions are required if any?               Family Comments: none  OBS brochure/video discussed/provided to patient/family: Yes              Name of person given brochure if not patient:               Relationship to patient:     For the majority of the shift this patient's behavior was Green.   Behavioral interventions performed were non.    ED NURSE PHONE NUMBER: [phone]

## 2024-05-22 NOTE — ED NOTES
Bed: ED20  Expected date: 5/21/24  Expected time: 10:25 PM  Means of arrival: Ambulance  Comments:  HEMS 452 53M Chest pain

## 2024-07-01 ENCOUNTER — ANCILLARY PROCEDURE (OUTPATIENT)
Dept: ULTRASOUND IMAGING | Facility: CLINIC | Age: 54
End: 2024-07-01
Attending: INTERNAL MEDICINE
Payer: COMMERCIAL

## 2024-07-01 DIAGNOSIS — R68.81 EARLY SATIETY: ICD-10-CM

## 2024-07-01 DIAGNOSIS — R13.19 ESOPHAGEAL DYSPHAGIA: ICD-10-CM

## 2024-07-01 DIAGNOSIS — R10.13 ABDOMINAL PAIN, EPIGASTRIC: ICD-10-CM

## 2024-07-01 DIAGNOSIS — Z86.0100 HX OF COLONIC POLYPS: ICD-10-CM

## 2024-07-01 DIAGNOSIS — K20.0 EOSINOPHILIC ESOPHAGITIS: ICD-10-CM

## 2024-07-01 PROCEDURE — 76700 US EXAM ABDOM COMPLETE: CPT

## 2024-08-03 ENCOUNTER — HEALTH MAINTENANCE LETTER (OUTPATIENT)
Age: 54
End: 2024-08-03

## 2024-08-14 ENCOUNTER — HOSPITAL ENCOUNTER (OUTPATIENT)
Dept: NUCLEAR MEDICINE | Facility: CLINIC | Age: 54
Setting detail: NUCLEAR MEDICINE
Discharge: HOME | End: 2024-08-14
Attending: INTERNAL MEDICINE | Admitting: INTERNAL MEDICINE
Payer: COMMERCIAL

## 2024-08-14 DIAGNOSIS — R68.81 EARLY SATIETY: ICD-10-CM

## 2024-08-14 DIAGNOSIS — R10.13 EPIGASTRIC ABDOMINAL PAIN: ICD-10-CM

## 2024-08-14 PROCEDURE — A9541 TC99M SULFUR COLLOID: HCPCS | Performed by: RADIOLOGY

## 2024-08-14 PROCEDURE — 343N000001 HC RX 343: Performed by: RADIOLOGY

## 2024-08-14 PROCEDURE — 78264 GASTRIC EMPTYING IMG STUDY: CPT

## 2024-08-14 RX ADMIN — Medication 1 MILLICURIE: at 07:48

## 2024-08-16 ENCOUNTER — MEDICAL CORRESPONDENCE (OUTPATIENT)
Dept: HEALTH INFORMATION MANAGEMENT | Facility: CLINIC | Age: 54
End: 2024-08-16

## 2024-08-16 ENCOUNTER — HOSPITAL ENCOUNTER (OUTPATIENT)
Dept: NUCLEAR MEDICINE | Facility: CLINIC | Age: 54
Setting detail: NUCLEAR MEDICINE
Discharge: HOME | End: 2024-08-16
Attending: INTERNAL MEDICINE | Admitting: INTERNAL MEDICINE
Payer: COMMERCIAL

## 2024-08-16 VITALS — BODY MASS INDEX: 29.56 KG/M2 | WEIGHT: 206 LBS

## 2024-08-16 DIAGNOSIS — R10.13 ABDOMINAL PAIN, EPIGASTRIC: ICD-10-CM

## 2024-08-16 DIAGNOSIS — R68.81 EARLY SATIETY: ICD-10-CM

## 2024-08-16 PROCEDURE — 250N000011 HC RX IP 250 OP 636: Performed by: RADIOLOGY

## 2024-08-16 PROCEDURE — A9537 TC99M MEBROFENIN: HCPCS | Performed by: RADIOLOGY

## 2024-08-16 PROCEDURE — 343N000001 HC RX 343: Performed by: RADIOLOGY

## 2024-08-16 PROCEDURE — 78227 HEPATOBIL SYST IMAGE W/DRUG: CPT

## 2024-08-16 RX ORDER — KIT FOR THE PREPARATION OF TECHNETIUM TC 99M MEBROFENIN 45 MG/10ML
6 INJECTION, POWDER, LYOPHILIZED, FOR SOLUTION INTRAVENOUS ONCE
Status: COMPLETED | OUTPATIENT
Start: 2024-08-16 | End: 2024-08-16

## 2024-08-16 RX ADMIN — MEBROFENIN 6.2 MILLICURIE: 45 INJECTION, POWDER, LYOPHILIZED, FOR SOLUTION INTRAVENOUS at 07:15

## 2024-08-16 RX ADMIN — SINCALIDE 1.9 MCG: 5 INJECTION, POWDER, LYOPHILIZED, FOR SOLUTION INTRAVENOUS at 08:12

## 2024-08-29 ENCOUNTER — OFFICE VISIT (OUTPATIENT)
Dept: SURGERY | Facility: CLINIC | Age: 54
End: 2024-08-29
Payer: COMMERCIAL

## 2024-08-29 VITALS
WEIGHT: 207 LBS | HEART RATE: 57 BPM | HEIGHT: 70 IN | SYSTOLIC BLOOD PRESSURE: 120 MMHG | BODY MASS INDEX: 29.63 KG/M2 | DIASTOLIC BLOOD PRESSURE: 82 MMHG | OXYGEN SATURATION: 96 %

## 2024-08-29 DIAGNOSIS — R10.11 ABDOMINAL PAIN, RIGHT UPPER QUADRANT: Primary | ICD-10-CM

## 2024-08-29 PROCEDURE — 99244 OFF/OP CNSLTJ NEW/EST MOD 40: CPT | Performed by: SURGERY

## 2024-08-29 NOTE — LETTER
"September 16, 2024          Selene Gardner MD  [facility]  [address] [address]  [place] [address]      RE:   Nitin Samuels 1970      Dear Colleague,    Thank you for referring your patient, Nitin Samuels, to [facility]. Please see a copy of my visit note below.    This is a 54-year-old gentleman referred by the above provider for consultation regarding gallbladder.  He reports that he has been having right upper quadrant pain for approximately 6 months.  Unclear if this is food related.  He states that this is worse at the end of the day.  He describes his discomfort as 3 or 4 out of 10.  He has no pain with activity.  He notices some discomfort when lying on his right-hand side.  He has been evaluated for this with various imaging studies.  Has a known history of eosinophilic esophagitis.  Ultrasound of the abdomen obtained on July 1 showed no gallstones or gallbladder wall thickening.  Gastric emptying study was unremarkable.  Nuclear medicine study showed normal gallbladder ejection fraction.     PMH:   has a past medical history of Hypertension.  PSH:    has a past surgical history that includes Abdomen surgery; hernia repair; and Cystoscopy.  Social History:   reports that he has never smoked. He has never used smokeless tobacco. He reports current alcohol use. He reports that he does not use drugs.  Family History:  family history includes Heart Disease in his father and mother.  Medications/Allergies: Home medications and allergies reviewed.     ROS:  The 10 point Review of Systems is negative other than noted in the HPI.     Physical Exam:  /82   Pulse 57   Ht 1.778 m (5' 10\")   Wt 93.9 kg (207 lb)   SpO2 96%   BMI 29.70 kg/m    GENERAL: Generally appears well.  Psych: Alert and Oriented.  Normal affect  Eyes: Sclera clear  Respiratory:  Lungs clear to ausculation bilaterally with good air excursion  Cardiovascular:  " Regular Rate and Rhythm with no murmurs gallops or rubs, normal peripheral pulses  GI: Abdomen Non Distended Soft Non-Tender  No hernias palpated.  Lymphatic/Hematologic/Immune:  No femoral or cervical lymphadenopathy.  Integumentary:  No rashes  Neurological: grossly intact        All new lab and imaging data was reviewed.      Impression and Plan:  Patient is a 54 year old male with RUQ pain.  Of unclear etiology.  Overall at this point seems to be most consistent with musculoskeletal.     PLAN:   I discussed the pathophysiology of gallbladder disease and complications of cholecystitis and choledocholithiasis with the patient.  In the absence of identifiable gallbladder abnormality at present I see no evidence to suggest it reasonable to proceed with cholecystectomy. t. I have recommended a low fat diet and instructed the patient to go to ER if they developed persistent pain, persistent nausea and vomiting, or yellowness of skin.    Again, thank you for allowing me to participate in the care of your patient.      Sincerely,      Wild Cartagena MD

## 2024-09-16 NOTE — PROGRESS NOTES
"[facility]  Surgery Consultation    PCP:  Rohit Longoria [phone]  Consultation requested by: Seelne Gardner MD    HPI: This is a 54-year-old gentleman referred by the above provider for consultation regarding gallbladder.  He reports that he has been having right upper quadrant pain for approximately 6 months.  Unclear if this is food related.  He states that this is worse at the end of the day.  He describes his discomfort as 3 or 4 out of 10.  He has no pain with activity.  He notices some discomfort when lying on his right-hand side.  He has been evaluated for this with various imaging studies.  Has a known history of eosinophilic esophagitis.  Ultrasound of the abdomen obtained on July 1 showed no gallstones or gallbladder wall thickening.  Gastric emptying study was unremarkable.  Nuclear medicine study showed normal gallbladder ejection fraction.    PMH:   has a past medical history of Hypertension.  PSH:    has a past surgical history that includes Abdomen surgery; hernia repair; and Cystoscopy.  Social History:   reports that he has never smoked. He has never used smokeless tobacco. He reports current alcohol use. He reports that he does not use drugs.  Family History:  family history includes Heart Disease in his father and mother.  Medications/Allergies: Home medications and allergies reviewed.    ROS:  The 10 point Review of Systems is negative other than noted in the HPI.    Physical Exam:  /82   Pulse 57   Ht 1.778 m (5' 10\")   Wt 93.9 kg (207 lb)   SpO2 96%   BMI 29.70 kg/m    GENERAL: Generally appears well.  Psych: Alert and Oriented.  Normal affect  Eyes: Sclera clear  Respiratory:  Lungs clear to ausculation bilaterally with good air excursion  Cardiovascular:  Regular Rate and Rhythm with no murmurs gallops or rubs, normal peripheral pulses  GI: Abdomen Non Distended Soft Non-Tender  No hernias palpated.  Lymphatic/Hematologic/Immune:  No femoral or cervical " lymphadenopathy.  Integumentary:  No rashes  Neurological: grossly intact      All new lab and imaging data was reviewed.     Impression and Plan:  Patient is a 54 year old male with RUQ pain.  Of unclear etiology.  Overall at this point seems to be most consistent with musculoskeletal.    PLAN:   I discussed the pathophysiology of gallbladder disease and complications of cholecystitis and choledocholithiasis with the patient.  In the absence of identifiable gallbladder abnormality at present I see no evidence to suggest it reasonable to proceed with cholecystectomy. t. I have recommended a low fat diet and instructed the patient to go to ER if they developed persistent pain, persistent nausea and vomiting, or yellowness of skin.      Thank you very much for this consult.    Wild Cartagena M.D.  [facility]  [phone]    Please route or send letter to:  Primary Care Provider (PCP) and Referring Provider

## 2025-06-18 NOTE — H&P
Hub staff attempted to follow warm transfer process and was unsuccessful     Caller: CHAY CASTANEDA    Relationship to patient: Mother    Best call back number: [phone]    PATIENTS MOTHER IS WANTING TO MOVE APPOINTMENT UP AND COME SOONER IF AVAILABLE            [facility]    History and Physical - Hospitalist Service       Date of Admission:  5/21/2024    Assessment & Plan      Nitin Samuels is a 53 year old male admitted on 5/21/2024. He presents to the emergency department for evaluation of central chest pressure improved with nitroglycerin.  Some nausea and diaphoresis with EMS.    Coronary artery disease: September 2023 CT coronary calcium score of 421 placing him in the 95th age-matched percentile.  , circumflex 166, RCA 87.6.  Family history of early coronary artery disease in his father (as well as maternal uncle and cousin).  Non-smoker.  Chest pressure: Had onset of diffuse chest pressure while laying down at rest (was awake), somewhat reminiscent of GERD, but more significant and across his entire chest.  Persisted for over 20 minutes before EMS was contacted, improved somewhat with nitroglycerin spray.  Some nausea and diaphoresis with EMS.  Troponins within normal limits, but did have a slight increase between troponin 1 and 2.  Reports 1 week ago he had more fatigue, dyspnea, and diaphoresis than he would have anticipated after mowing his small lawn.  -Aspirin 81 mg daily  -Repeat troponin x 2, trend to peak  -Repeat lipid panel ordered for a.m. has a history of hyperlipidemia  -I have ordered Lipitor 40 mg daily while awaiting pharmacy reconciliation of medications.  He is uncertain of his dose, but tells me he is on a cholesterol pill through [facility] physicians; I am unable to find any documentation of this prior statin other than that he was offered 1 during 2021 cardiology visit and opted to await reassessment of lipids following lifestyle modifications  -Cardiac telemetry  -Exercise stress echocardiogram.  No inducible ischemia on 2021 study.  -As needed nitroglycerin    Eosinophilic esophagitis: No longer using Flovent inhaler or scheduled PPI therapy.  Describes occasional over-the-counter 14-day course of  omeprazole for GERD symptoms.  He does report some issues with dysphagia concerning for recurrence of eosinophilic esophagitis.  -Protonix twice daily.  Recommend refill prescription at discharge  -Consider refill of Flovent at discharge.  Not restarted in house given observation admission  -Patient should follow-up with gastroenterology; appears that he was largely lost to follow-up after 2019 EGD with dilation at that time.  -Monitor for evidence of overt dysphagia.  Could consider esophagram or more urgent GI consultation if noted.    Panic disorder without agoraphobia: Previously followed through [facility] psychiatry in [facility] and had been on Zoloft in the past.    Hypertension: Prior diagnosis and was initiated on metoprolol in 2021, but self discontinued at some point later as he was not sure that this was helping him.  Though beta-blockade might help him with his PVCs as well as panic disorder, will trial amlodipine with his esophageal dysmotility history, may also be beneficial for PVCs.  -Amlodipine 5 mg daily initiated            Diet:  Cardiac diet no caffeine  DVT Prophylaxis: Pneumatic Compression Devices  Hernandez Catheter: Not present  Lines: None     Cardiac Monitoring: None  Code Status:  Full code    Clinically Significant Risk Factors Present on Admission                                  Disposition Plan     Medically Ready for Discharge: Anticipated Today pending stable troponin, negative stress echocardiogram.           Derrick Gonzalez MD  Hospitalist Service  [facility]  Securely message with "[employer] (more info)  Text page via [employer] Paging/Directory     ______________________________________________________________________    Chief Complaint   Central chest pressure    History is obtained from the patient, chart review, discussion with Dr. Kwong in the emergency department, review of outside records including prior cardiology follow-up for PVCs, GI  evaluation including 2019 EGD report through [facility] in [place].    History of Present Illness   Nitin Samuels is a 53 year old male who presents to the emergency department via EMS for evaluation of chest pressure.    Tonight, while laying on his side and watching TV, patient describes onset of central chest pressure encompassing the entirety of his chest.  Not immediately associated with any shortness of breath or diaphoresis.  Initially took some [place] to see if this would help his symptoms, but after approximately 20 minutes, contacted EMS as he felt something was different and wrong.  Received sublingual nitro spray with some improvement in his symptoms.  Received 2 additional doses and believes there was again some mild improvement.  When he arrived in the emergency department and was reclined in bed, his symptoms resolved.  He is uncertain if this relief was from his position or from prior nitroglycerin administration.  Reports that when en route to the hospital with EMS, he did have some nausea and diaphoresis which has since subsided    He has not had recurrence of his chest discomfort, nausea, or diaphoresis.  He feels well currently.  No palpitations reported.    Patient has no prior personal history of coronary artery disease and is a non-smoker, but does have a history of chest pain with negative ischemic evaluation by stress echocardiogram in December 2021.  He has also undergone Holter monitoring in the past and has been noted to have some resting bradycardia as well as intermittent PVCs.  He met with cardiology in 2021 and was initiated on low-dose metoprolol after several visits to the emergency department for chest discomfort and palpitations and subsequent negative stress echocardiogram.  There was discussion regarding initiation of cholesterol medication for hyperlipidemia, but patient opted for lifestyle modification and reevaluation.  I see later a referral for coronary  "artery CT calcium study to further risk stratify, and I see that this was completed in September 2023 with an elevated CAC score (421), but I am unable to see any further follow-up.  I believe that patient has largely been lost to follow-up.  He self discontinued his metoprolol at some point in the past as he was not sure it was helping his symptoms.  No longer utilizes Flovent for his eosinophilic esophagitis and only occasionally takes PPI therapy via 14-day over-the-counter omeprazole treatment periods.  No longer follows with gastroenterology.    Patient has a history of eosinophilic esophagitis.  Associated progressive solid to liquid dysphagia requiring dilation during EGD in 2019.  He tells me that he has had increased dysphagia over the past years, but has not had follow-up.  He reports that he does have GERD symptoms and some esophageal dysmotility which will cause some central chest discomfort in the past, but something about tonight's event was different.  Typically his reflux symptoms are central in his chest, where this was a more significant pressure across his entire chest.  He uses the words \"as though something was sitting\" on him.    1 week ago, patient reports that he was mowing his small yard, which typically only takes him 15 minutes to complete, and felt that he was more fatigued, winded, and diaphoretic than he typically would be.  Did not feel it was hot enough for him to be as sweaty as he was.  No chest pressure with this activity, however.  In terms of daily activity, patient walks up 2 flights of stairs without difficulty and has not noted similar chest discomfort.  He will occasionally swim, average once per week, for 30 minutes with moderate intensity without symptoms.  Last time he swam, however, was about 1 month ago.    No fevers or chills.  No immobility or lower extremity swelling.    He tells me that over the past month he has occasionally noted a \"stitch\" in his right upper " quadrant that he will notice when he is sitting in his car driving.  He has not been particularly bothersome to him.  Not able to reproduce at this time.      Patient had a 5 mg THC gummy this evening.  Tells me he will have 1 of these a few times per week, and has never had similar symptoms in the past      Past Medical History    Past Medical History:   Diagnosis Date     Hypertension    Hyperlipidemia  Nephrolithiasis  Panic disorder without agoraphobia  PVCs    Past Surgical History   Past Surgical History:   Procedure Laterality Date     CYSTOSCOPY       GENITOURINARY SURGERY      for stones     HERNIA REPAIR      inguinal left       Prior to Admission Medications   Prior to Admission Medications   Reports he utilizes intermittent omeprazole over-the-counter.  No longer on metoprolol.  Describes a statin therapy, but is uncertain of dose.              Physical Exam   Vital Signs: Temp: 98  F (36.7  C) Temp src: Oral BP: (!) 124/94 Pulse: 79   Resp: 22 SpO2: 95 % O2 Device: None (Room air)        General Appearance: Well-appearing 53-year-old male in no acute distress.  He actually appears younger than stated age  Eyes: No scleral icterus or injection  HEENT: Normocephalic and atraumatic  Respiratory: Breath sounds are clear bilateral to auscultation with no wheezes, no crackles, no splinting  Cardiovascular: Regular rate and rhythm.  Heart rate currently in the low 60 range.  No appreciable murmur.  Patient reports his baseline heart rate is in the 50-60 range  GI: Abdomen soft, nontender palpation.  No palpable mass.  Lymph/Hematologic: No lower extremity edema  Genitourinary: No CVA tenderness to percussion  Skin: No appreciable rash, no jaundice  Musculoskeletal: Muscular tone and bulk intact in all extremities and appropriate for age.  Neurologic: Alert, conversant, appropriate in conversation.  Mental status grossly intact  Psychiatric: Very pleasant, normal affect    Medical Decision Making       65  MINUTES SPENT BY ME on the date of service doing chart review, history, exam, documentation & further activities per the note.      Data     I have personally reviewed the following data over the past 24 hrs:    10.9  \   15.6   / 205     140 107 14.7 /  135 (H)   3.4 20 (L) 0.98 \     ALT: 40 AST: 30 AP: 68 TBILI: 0.4   ALB: 4.4 TOT PROTEIN: 7.1 LIPASE: 26     Trop: 21 BNP: N/A       Imaging results reviewed over the past 24 hrs:   Recent Results (from the past 24 hour(s))   XR Chest 2 Views    Narrative    EXAM: XR CHEST 2 VIEWS  LOCATION: [facility]  DATE: 5/21/2024    INDICATION: chest pain  COMPARISON: 12/5/2021      Impression    IMPRESSION: Normal heart size. Lungs are clear. No pneumothorax or pleural effusion.

## 2025-08-16 ENCOUNTER — HEALTH MAINTENANCE LETTER (OUTPATIENT)
Age: 55
End: 2025-08-16